# Patient Record
Sex: FEMALE | Race: BLACK OR AFRICAN AMERICAN | NOT HISPANIC OR LATINO | Employment: FULL TIME | URBAN - METROPOLITAN AREA
[De-identification: names, ages, dates, MRNs, and addresses within clinical notes are randomized per-mention and may not be internally consistent; named-entity substitution may affect disease eponyms.]

---

## 2019-05-28 ENCOUNTER — HOSPITAL ENCOUNTER (EMERGENCY)
Facility: HOSPITAL | Age: 29
Discharge: HOME | End: 2019-05-28
Attending: EMERGENCY MEDICINE
Payer: COMMERCIAL

## 2019-05-28 VITALS
WEIGHT: 162 LBS | DIASTOLIC BLOOD PRESSURE: 74 MMHG | RESPIRATION RATE: 16 BRPM | TEMPERATURE: 98 F | OXYGEN SATURATION: 100 % | HEART RATE: 63 BPM | HEIGHT: 67 IN | BODY MASS INDEX: 25.43 KG/M2 | SYSTOLIC BLOOD PRESSURE: 118 MMHG
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
## 2019-05-28 DIAGNOSIS — R51.9 NONINTRACTABLE HEADACHE, UNSPECIFIED CHRONICITY PATTERN, UNSPECIFIED HEADACHE TYPE: ICD-10-CM

## 2019-05-28 DIAGNOSIS — S29.019A THORACIC MYOFASCIAL STRAIN, INITIAL ENCOUNTER: Primary | ICD-10-CM

## 2019-05-28 DIAGNOSIS — S39.012A LUMBAR STRAIN, INITIAL ENCOUNTER: ICD-10-CM

## 2019-05-28 LAB
B-HCG UR QL: NEGATIVE
CTP QC/QA: YES

## 2019-05-28 PROCEDURE — 96372 THER/PROPH/DIAG INJ SC/IM: CPT

## 2019-05-28 PROCEDURE — 63600175 PHARM REV CODE 636 W HCPCS: Performed by: NURSE PRACTITIONER

## 2019-05-28 PROCEDURE — 99284 EMERGENCY DEPT VISIT MOD MDM: CPT | Mod: 25

## 2019-05-28 PROCEDURE — 81025 URINE PREGNANCY TEST: CPT | Performed by: NURSE PRACTITIONER

## 2019-05-28 PROCEDURE — 25000003 PHARM REV CODE 250: Performed by: NURSE PRACTITIONER

## 2019-05-28 RX ORDER — METOCLOPRAMIDE 10 MG/1
10 TABLET ORAL EVERY 6 HOURS PRN
Qty: 12 TABLET | Refills: 0 | Status: SHIPPED | OUTPATIENT
Start: 2019-05-28 | End: 2019-10-03

## 2019-05-28 RX ORDER — CYCLOBENZAPRINE HCL 10 MG
10 TABLET ORAL 3 TIMES DAILY PRN
Qty: 15 TABLET | Refills: 0 | Status: SHIPPED | OUTPATIENT
Start: 2019-05-28 | End: 2019-10-03

## 2019-05-28 RX ORDER — ETODOLAC 300 MG/1
300 CAPSULE ORAL 2 TIMES DAILY
Qty: 30 CAPSULE | Refills: 0 | Status: SHIPPED | OUTPATIENT
Start: 2019-05-28 | End: 2019-10-03

## 2019-05-28 RX ORDER — KETOROLAC TROMETHAMINE 30 MG/ML
15 INJECTION, SOLUTION INTRAMUSCULAR; INTRAVENOUS
Status: COMPLETED | OUTPATIENT
Start: 2019-05-28 | End: 2019-05-28

## 2019-05-28 RX ORDER — METOCLOPRAMIDE 10 MG/1
10 TABLET ORAL
Status: COMPLETED | OUTPATIENT
Start: 2019-05-28 | End: 2019-05-28

## 2019-05-28 RX ADMIN — METOCLOPRAMIDE 10 MG: 10 TABLET ORAL at 11:05

## 2019-05-28 RX ADMIN — KETOROLAC TROMETHAMINE 15 MG: 30 INJECTION, SOLUTION INTRAMUSCULAR at 10:05

## 2019-05-29 NOTE — DISCHARGE INSTRUCTIONS
Thank you for allowing me to care for you today.  I hope our treatment plan will make you feel better in the next few days.  In order for me to take better care of my future patients and improve our Emergency Department, I would appreciate if you can provide us with feedback.  In the next few days, you may receive a survey in the mail.  If you do, it would mean a great deal to me if you would please take the time to complete it.    Thank you and I hope you feel better.  Karina Maldonado NP
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
## 2019-10-03 ENCOUNTER — LAB VISIT (OUTPATIENT)
Dept: LAB | Facility: HOSPITAL | Age: 29
End: 2019-10-03
Attending: FAMILY MEDICINE
Payer: COMMERCIAL

## 2019-10-03 ENCOUNTER — OFFICE VISIT (OUTPATIENT)
Dept: FAMILY MEDICINE | Facility: CLINIC | Age: 29
End: 2019-10-03
Payer: COMMERCIAL

## 2019-10-03 VITALS
HEART RATE: 72 BPM | OXYGEN SATURATION: 99 % | DIASTOLIC BLOOD PRESSURE: 82 MMHG | TEMPERATURE: 98 F | RESPIRATION RATE: 16 BRPM | SYSTOLIC BLOOD PRESSURE: 118 MMHG | BODY MASS INDEX: 26.4 KG/M2 | WEIGHT: 168.19 LBS | HEIGHT: 67 IN

## 2019-10-03 DIAGNOSIS — G44.029 CHRONIC CLUSTER HEADACHE, NOT INTRACTABLE: Primary | ICD-10-CM

## 2019-10-03 DIAGNOSIS — Z00.00 WELL ADULT EXAM: ICD-10-CM

## 2019-10-03 LAB
BASOPHILS # BLD AUTO: 0.03 K/UL (ref 0–0.2)
BASOPHILS NFR BLD: 0.5 % (ref 0–1.9)
DIFFERENTIAL METHOD: ABNORMAL
EOSINOPHIL # BLD AUTO: 0.1 K/UL (ref 0–0.5)
EOSINOPHIL NFR BLD: 0.9 % (ref 0–8)
ERYTHROCYTE [DISTWIDTH] IN BLOOD BY AUTOMATED COUNT: 13.3 % (ref 11.5–14.5)
HCT VFR BLD AUTO: 38 % (ref 37–48.5)
HGB BLD-MCNC: 12 G/DL (ref 12–16)
IMM GRANULOCYTES # BLD AUTO: 0.01 K/UL (ref 0–0.04)
IMM GRANULOCYTES NFR BLD AUTO: 0.2 % (ref 0–0.5)
LYMPHOCYTES # BLD AUTO: 2.1 K/UL (ref 1–4.8)
LYMPHOCYTES NFR BLD: 35.4 % (ref 18–48)
MCH RBC QN AUTO: 27.1 PG (ref 27–31)
MCHC RBC AUTO-ENTMCNC: 31.6 G/DL (ref 32–36)
MCV RBC AUTO: 86 FL (ref 82–98)
MONOCYTES # BLD AUTO: 0.5 K/UL (ref 0.3–1)
MONOCYTES NFR BLD: 8.9 % (ref 4–15)
NEUTROPHILS # BLD AUTO: 3.2 K/UL (ref 1.8–7.7)
NEUTROPHILS NFR BLD: 54.1 % (ref 38–73)
NRBC BLD-RTO: 0 /100 WBC
PLATELET # BLD AUTO: 282 K/UL (ref 150–350)
PMV BLD AUTO: 10.7 FL (ref 9.2–12.9)
RBC # BLD AUTO: 4.43 M/UL (ref 4–5.4)
WBC # BLD AUTO: 5.84 K/UL (ref 3.9–12.7)

## 2019-10-03 PROCEDURE — 84439 ASSAY OF FREE THYROXINE: CPT

## 2019-10-03 PROCEDURE — 99204 PR OFFICE/OUTPT VISIT, NEW, LEVL IV, 45-59 MIN: ICD-10-PCS | Mod: S$GLB,,, | Performed by: FAMILY MEDICINE

## 2019-10-03 PROCEDURE — 3008F PR BODY MASS INDEX (BMI) DOCUMENTED: ICD-10-PCS | Mod: CPTII,S$GLB,, | Performed by: FAMILY MEDICINE

## 2019-10-03 PROCEDURE — 80061 LIPID PANEL: CPT

## 2019-10-03 PROCEDURE — 99204 OFFICE O/P NEW MOD 45 MIN: CPT | Mod: S$GLB,,, | Performed by: FAMILY MEDICINE

## 2019-10-03 PROCEDURE — 99999 PR PBB SHADOW E&M-EST. PATIENT-LVL III: ICD-10-PCS | Mod: PBBFAC,,, | Performed by: FAMILY MEDICINE

## 2019-10-03 PROCEDURE — 36415 COLL VENOUS BLD VENIPUNCTURE: CPT | Mod: PO

## 2019-10-03 PROCEDURE — 99999 PR PBB SHADOW E&M-EST. PATIENT-LVL III: CPT | Mod: PBBFAC,,, | Performed by: FAMILY MEDICINE

## 2019-10-03 PROCEDURE — 82306 VITAMIN D 25 HYDROXY: CPT

## 2019-10-03 PROCEDURE — 85025 COMPLETE CBC W/AUTO DIFF WBC: CPT

## 2019-10-03 PROCEDURE — 80053 COMPREHEN METABOLIC PANEL: CPT

## 2019-10-03 PROCEDURE — 83036 HEMOGLOBIN GLYCOSYLATED A1C: CPT

## 2019-10-03 PROCEDURE — 84443 ASSAY THYROID STIM HORMONE: CPT

## 2019-10-03 PROCEDURE — 3008F BODY MASS INDEX DOCD: CPT | Mod: CPTII,S$GLB,, | Performed by: FAMILY MEDICINE

## 2019-10-03 RX ORDER — BUTALBITAL, ACETAMINOPHEN AND CAFFEINE 50; 325; 40 MG/1; MG/1; MG/1
1 TABLET ORAL 2 TIMES DAILY PRN
Qty: 20 TABLET | Refills: 1 | Status: SHIPPED | OUTPATIENT
Start: 2019-10-03 | End: 2019-10-13

## 2019-10-03 NOTE — PROGRESS NOTES
Chief Complaint   Patient presents with    Headaches    Cyst     On neck    Establish Care       Unique Cody is a 28 y.o. female who presents to [facility].  Chronic medical issues, if present, have been documented.  Acute medical issues, if present have been documented in the Chief Complaint.     Cyst   Associated symptoms include headaches. Pertinent negatives include no abdominal pain, anorexia, arthralgias, chest pain, chills, congestion, coughing, diaphoresis, fatigue, fever, joint swelling, myalgias, nausea, neck pain, numbness, sore throat, swollen glands, visual change, vomiting or weakness.   Headache    This is a recurrent problem. The current episode started 1 to 4 weeks ago. The problem occurs daily. The problem has been unchanged. The pain is located in the bilateral, frontal and parietal region. The pain does not radiate. The pain quality is similar to prior headaches. The quality of the pain is described as aching, dull, shooting and stabbing. The pain is at a severity of 5/10. The pain is moderate. Associated symptoms include dizziness, phonophobia and photophobia. Pertinent negatives include no abdominal pain, abnormal behavior, anorexia, back pain, blurred vision, coughing, drainage, ear pain, eye pain, eye redness, eye watering, facial sweating, fever, hearing loss, insomnia, loss of balance, muscle aches, nausea, neck pain, numbness, rhinorrhea, scalp tenderness, seizures, sinus pressure, sore throat, swollen glands, tingling, tinnitus, visual change, vomiting, weakness or weight loss. The symptoms are aggravated by bright light and noise. She has tried acetaminophen and NSAIDs for the symptoms. The treatment provided moderate relief. There is no history of cancer, cluster headaches, hypertension, immunosuppression, migraine headaches, migraines in the family, obesity, pseudotumor cerebri, recent head traumas, sinus disease or TMJ.       ROS  Review of Systems   Constitutional:  "Negative.  Negative for activity change, appetite change, chills, diaphoresis, fatigue, fever, unexpected weight change and weight loss.   HENT: Negative.  Negative for congestion, ear pain, hearing loss, nosebleeds, postnasal drip, rhinorrhea, sinus pressure, sneezing, sore throat, tinnitus and trouble swallowing.    Eyes: Positive for photophobia. Negative for blurred vision, pain, redness and visual disturbance.   Respiratory: Negative for cough, choking and shortness of breath.    Cardiovascular: Negative for chest pain and leg swelling.   Gastrointestinal: Negative for abdominal pain, anorexia, constipation, diarrhea, nausea and vomiting.   Genitourinary: Negative for difficulty urinating, dysuria, frequency and urgency.   Musculoskeletal: Negative.  Negative for arthralgias, back pain, gait problem, joint swelling, myalgias and neck pain.   Skin: Negative.    Allergic/Immunologic: Negative for environmental allergies and food allergies.   Neurological: Positive for dizziness and headaches. Negative for tingling, tremors, seizures, syncope, facial asymmetry, speech difficulty, weakness, light-headedness, numbness and loss of balance.   Psychiatric/Behavioral: Negative.  Negative for confusion, decreased concentration, dysphoric mood and sleep disturbance. The patient is not nervous/anxious and does not have insomnia.        Physical Exam  Vitals:    10/03/19 1337   BP: 118/82   Pulse: 72   Resp: 16   Temp: 98.1 °F (36.7 °C)    Body mass index is 26.35 kg/m².  Weight: 76.3 kg (168 lb 3.4 oz)   Height: 5' 7" (170.2 cm)     Physical Exam   Constitutional: She is oriented to person, place, and time. She appears well-developed and well-nourished. She is active and cooperative.  Non-toxic appearance. She does not have a sickly appearance. She does not appear ill. No distress.   HENT:   Head: Normocephalic and atraumatic.       Right Ear: Hearing and external ear normal. No decreased hearing is noted.   Left Ear: " Hearing and external ear normal. No decreased hearing is noted.   Nose: Nose normal. No rhinorrhea or nasal deformity.   Mouth/Throat: Uvula is midline and oropharynx is clear and moist. She does not have dentures. Normal dentition.   Eyes: Pupils are equal, round, and reactive to light. Conjunctivae, EOM and lids are normal. Right eye exhibits no chemosis, no discharge and no exudate. No foreign body present in the right eye. Left eye exhibits no chemosis, no discharge and no exudate. No foreign body present in the left eye. No scleral icterus.   Neck: Normal range of motion and full passive range of motion without pain. Neck supple.   Cardiovascular: Normal rate, regular rhythm, S1 normal, S2 normal and normal heart sounds. Exam reveals no gallop and no friction rub.   No murmur heard.  Pulmonary/Chest: Effort normal and breath sounds normal. No accessory muscle usage. No respiratory distress. She has no decreased breath sounds. She has no wheezes. She has no rhonchi. She has no rales.   Abdominal: Soft. Normal appearance. She exhibits no distension. There is no hepatosplenomegaly. There is no tenderness. There is no rigidity, no rebound and no guarding.   Musculoskeletal: Normal range of motion.   Neurological: She is alert and oriented to person, place, and time. She has normal strength. No cranial nerve deficit or sensory deficit. She exhibits normal muscle tone. She displays no seizure activity. Coordination and gait normal.   Skin: Skin is warm, dry and intact. No rash noted. She is not diaphoretic.   Psychiatric: She has a normal mood and affect. Her speech is normal and behavior is normal. Judgment and thought content normal. Cognition and memory are normal. She is attentive.       Assessment & Plan    Discussion of plan of care including treatment options regarding health and wellness were reviewed and discussed with patient.  Any changes to medication or treatment plan, as well as any screening blood  test, imaging, or referrals to specialist, are documented.  Follow up as indicated.     1. Chronic cluster headache, not intractable  I encouraged patient to use an over the counter pain medication containing caffeine to alleviate the symptoms.  I also recommended patient avoid strenuous activity and minimize surrounding stimulation.  If headache is persistent, patient should follow up for further   evaluation, which may include vision screening and neurology consult.  Medication prescribed for use only as symptoms require.    - butalbital-acetaminophen-caffeine -40 mg (FIORICET, ESGIC) -40 mg per tablet; Take 1 tablet by mouth 2 (two) times daily as needed for Pain.  Dispense: 20 tablet; Refill: 1    2. Well adult exam  Screening test will be ordered and once results available patient will be notified of results and managed accordingly.    - CBC auto differential; Future  - Comprehensive metabolic panel; Future  - Hemoglobin A1c; Future  - Lipid panel; Future  - Vitamin D; Future  - TSH; Future  - T4, free; Future       No follow-ups on file.      ACTIVE MEDICAL ISSUES:  Documented in Problem List    PAST MEDICAL HISTORY  Documented  .  PAST SURGICAL HISTORY:  Documented    SOCIAL HISTORY:  Documented    FAMILY HISTORY:  Documented    ALLERGIES AND MEDICATIONS: updated and reviewed.  Documented    Health Maintenance       Date Due Completion Date    TETANUS VACCINE 06/06/2011 6/6/2001    Influenza Vaccine (1) 09/01/2019 ---    Pap Smear with HPV Cotest 08/09/2020 8/9/2017

## 2019-10-04 LAB
25(OH)D3+25(OH)D2 SERPL-MCNC: 22 NG/ML (ref 30–96)
ALBUMIN SERPL BCP-MCNC: 3.9 G/DL (ref 3.5–5.2)
ALP SERPL-CCNC: 70 U/L (ref 55–135)
ALT SERPL W/O P-5'-P-CCNC: 11 U/L (ref 10–44)
ANION GAP SERPL CALC-SCNC: 10 MMOL/L (ref 8–16)
AST SERPL-CCNC: 19 U/L (ref 10–40)
BILIRUB SERPL-MCNC: 0.3 MG/DL (ref 0.1–1)
BUN SERPL-MCNC: 15 MG/DL (ref 6–20)
CALCIUM SERPL-MCNC: 9 MG/DL (ref 8.7–10.5)
CHLORIDE SERPL-SCNC: 108 MMOL/L (ref 95–110)
CHOLEST SERPL-MCNC: 181 MG/DL (ref 120–199)
CHOLEST/HDLC SERPL: 2.7 {RATIO} (ref 2–5)
CO2 SERPL-SCNC: 23 MMOL/L (ref 23–29)
CREAT SERPL-MCNC: 0.8 MG/DL (ref 0.5–1.4)
EST. GFR  (AFRICAN AMERICAN): >60 ML/MIN/1.73 M^2
EST. GFR  (NON AFRICAN AMERICAN): >60 ML/MIN/1.73 M^2
ESTIMATED AVG GLUCOSE: 105 MG/DL (ref 68–131)
GLUCOSE SERPL-MCNC: 73 MG/DL (ref 70–110)
HBA1C MFR BLD HPLC: 5.3 % (ref 4–5.6)
HDLC SERPL-MCNC: 68 MG/DL (ref 40–75)
HDLC SERPL: 37.6 % (ref 20–50)
LDLC SERPL CALC-MCNC: 100 MG/DL (ref 63–159)
NONHDLC SERPL-MCNC: 113 MG/DL
POTASSIUM SERPL-SCNC: 4.6 MMOL/L (ref 3.5–5.1)
PROT SERPL-MCNC: 7 G/DL (ref 6–8.4)
SODIUM SERPL-SCNC: 141 MMOL/L (ref 136–145)
T4 FREE SERPL-MCNC: 0.88 NG/DL (ref 0.71–1.51)
TRIGL SERPL-MCNC: 65 MG/DL (ref 30–150)
TSH SERPL DL<=0.005 MIU/L-ACNC: 0.93 UIU/ML (ref 0.4–4)

## 2019-10-29 ENCOUNTER — PATIENT OUTREACH (OUTPATIENT)
Dept: ADMINISTRATIVE | Facility: HOSPITAL | Age: 29
End: 2019-10-29

## 2019-11-12 ENCOUNTER — TELEPHONE (OUTPATIENT)
Dept: FAMILY MEDICINE | Facility: CLINIC | Age: 29
End: 2019-11-12

## 2019-11-12 NOTE — TELEPHONE ENCOUNTER
Pt was scheduled today but was not able to come in. Patient wondering if PCP can look at results. Please advise of recent labs.    Component      Latest Ref Rng & Units 10/3/2019   WBC      3.90 - 12.70 K/uL 5.84   RBC      4.00 - 5.40 M/uL 4.43   Hemoglobin      12.0 - 16.0 g/dL 12.0   Hematocrit      37.0 - 48.5 % 38.0   MCV      82 - 98 fL 86   MCH      27.0 - 31.0 pg 27.1   MCHC      32.0 - 36.0 g/dL 31.6 (L)   RDW      11.5 - 14.5 % 13.3   Platelets      150 - 350 K/uL 282   MPV      9.2 - 12.9 fL 10.7   Immature Granulocytes      0.0 - 0.5 % 0.2   Gran # (ANC)      1.8 - 7.7 K/uL 3.2   Immature Grans (Abs)      0.00 - 0.04 K/uL 0.01   Lymph #      1.0 - 4.8 K/uL 2.1   Mono #      0.3 - 1.0 K/uL 0.5   Eos #      0.0 - 0.5 K/uL 0.1   Baso #      0.00 - 0.20 K/uL 0.03   nRBC      0 /100 WBC 0   Gran%      38.0 - 73.0 % 54.1   Lymph%      18.0 - 48.0 % 35.4   Mono%      4.0 - 15.0 % 8.9   Eosinophil%      0.0 - 8.0 % 0.9   Basophil%      0.0 - 1.9 % 0.5   Differential Method       Automated   Sodium      136 - 145 mmol/L 141   Potassium      3.5 - 5.1 mmol/L 4.6   Chloride      95 - 110 mmol/L 108   CO2      23 - 29 mmol/L 23   Glucose      70 - 110 mg/dL 73   BUN, Bld      6 - 20 mg/dL 15   Creatinine      0.5 - 1.4 mg/dL 0.8   Calcium      8.7 - 10.5 mg/dL 9.0   PROTEIN TOTAL      6.0 - 8.4 g/dL 7.0   Albumin      3.5 - 5.2 g/dL 3.9   BILIRUBIN TOTAL      0.1 - 1.0 mg/dL 0.3   Alkaline Phosphatase      55 - 135 U/L 70   AST      10 - 40 U/L 19   ALT      10 - 44 U/L 11   Anion Gap      8 - 16 mmol/L 10   eGFR if African American      >60 mL/min/1.73 m:2 >60.0   eGFR if non African American      >60 mL/min/1.73 m:2 >60.0   Cholesterol      120 - 199 mg/dL 181   Triglycerides      30 - 150 mg/dL 65   HDL      40 - 75 mg/dL 68   LDL Cholesterol External      63.0 - 159.0 mg/dL 100.0   Hdl/Cholesterol Ratio      20.0 - 50.0 % 37.6   Total Cholesterol/HDL Ratio      2.0 - 5.0 2.7   Non-HDL Cholesterol      mg/dL 113    Hemoglobin A1C External      4.0 - 5.6 % 5.3   Estimated Avg Glucose      68 - 131 mg/dL 105   Vit D, 25-Hydroxy      30 - 96 ng/mL 22 (L)   TSH      0.400 - 4.000 uIU/mL 0.930   Free T4      0.71 - 1.51 ng/dL 0.88

## 2019-11-13 NOTE — TELEPHONE ENCOUNTER
Cholesterol improved  No longer in pre-diabetic range  Vit D low, recommend 2000 mg Vit D3 daily  All other labs normal

## 2020-04-06 ENCOUNTER — NURSE TRIAGE (OUTPATIENT)
Dept: ADMINISTRATIVE | Facility: CLINIC | Age: 30
End: 2020-04-06

## 2020-04-06 ENCOUNTER — OFFICE VISIT (OUTPATIENT)
Dept: FAMILY MEDICINE | Facility: CLINIC | Age: 30
End: 2020-04-06
Payer: COMMERCIAL

## 2020-04-06 DIAGNOSIS — R07.89 ACUTE CHEST WALL PAIN: Primary | ICD-10-CM

## 2020-04-06 PROCEDURE — 99213 OFFICE O/P EST LOW 20 MIN: CPT | Mod: GT,,, | Performed by: FAMILY MEDICINE

## 2020-04-06 PROCEDURE — 99213 PR OFFICE/OUTPT VISIT, EST, LEVL III, 20-29 MIN: ICD-10-PCS | Mod: GT,,, | Performed by: FAMILY MEDICINE

## 2020-04-06 NOTE — PROGRESS NOTES
Unique Cody is a 29 y.o. female.      Visit type: Virtual visit with synchronous audio and video  The patient is located outside of this physical clinic and a thorough physical exam was not performed.  The chief complaint was presented by patient and discussed during visit and is documented below.    100% of visit was face to face counseling, and total visit lasted over 15 minutes.     Each patient provided medical services by telemedicine is:  (1) informed of the relationship between the physician and patient and the respective role of any other health care provider with respect to management of the patient; and (2) notified that he or she may decline to receive medical services by telemedicine and may withdraw from such care at any time.    Notes:    Chest Pain    This is a new problem. The current episode started in the past 7 days. The onset quality is gradual. The problem occurs daily. The problem has been waxing and waning. The pain is present in the substernal region. The pain is at a severity of 3/10. The pain is mild. The quality of the pain is described as burning. The pain radiates to the upper back. Associated symptoms include back pain and dizziness. Pertinent negatives include no abdominal pain, claudication, cough, diaphoresis, exertional chest pressure, fever, headaches, hemoptysis, irregular heartbeat, leg pain, lower extremity edema, malaise/fatigue, nausea, near-syncope, numbness, orthopnea, palpitations, shortness of breath, sputum production, syncope, vomiting or weakness. She has tried nothing for the symptoms. There are no known risk factors.   Pertinent negatives for past medical history include no anxiety/panic attacks and no seizures.        ROS  Review of Systems   Constitutional: Negative.  Negative for activity change, appetite change, chills, diaphoresis, fatigue, fever, malaise/fatigue and unexpected weight change.   HENT: Negative.  Negative for congestion, ear pain, hearing  loss, nosebleeds, postnasal drip, rhinorrhea, sinus pressure, sneezing, sore throat and trouble swallowing.    Eyes: Negative for pain and visual disturbance.   Respiratory: Negative for cough, hemoptysis, sputum production, choking and shortness of breath.    Cardiovascular: Positive for chest pain (chest wall). Negative for palpitations, orthopnea, claudication, leg swelling, syncope and near-syncope.   Gastrointestinal: Negative for abdominal pain, constipation, diarrhea, nausea and vomiting.   Genitourinary: Negative for difficulty urinating, dysuria, frequency and urgency.   Musculoskeletal: Positive for back pain. Negative for arthralgias, gait problem, joint swelling and myalgias.   Skin: Negative.    Allergic/Immunologic: Negative for environmental allergies and food allergies.   Neurological: Positive for dizziness. Negative for seizures, syncope, weakness, light-headedness, numbness and headaches.   Psychiatric/Behavioral: Negative.  Negative for confusion, decreased concentration, dysphoric mood and sleep disturbance. The patient is not nervous/anxious.        Assessment & Plan    Discussion of plan of care including treatment options regarding health and wellness were reviewed and discussed with patient.  Any changes to medication or treatment plan, as well as any screening blood test, imaging, or referrals to specialist, are documented.  Follow up as indicated.     1. Acute chest wall pain  Advised that pain may be musculoskeletal and related to indigestion or heartburn, but that anxiety and muscle tension may be a factor.  Recommended PPI therapy and warm compress to back to alleviate symptoms; use of naproxen recommended as needed.       Follow up if symptoms worsen or fail to improve.        ACTIVE MEDICAL ISSUES:  Documented in Problem List    PAST MEDICAL HISTORY  Documented    PAST SURGICAL HISTORY:  Documented    SOCIAL HISTORY:  Documented    FAMILY HISTORY:  Documented    ALLERGIES AND  MEDICATIONS: updated and reviewed.  Documented    Health Maintenance       Date Due Completion Date    HIV Screening 12/24/2003 ---    TETANUS VACCINE 06/06/2011 6/6/2001    Influenza Vaccine (1) 09/01/2019 ---    Pap Smear with HPV Cotest 04/09/2022 4/9/2019

## 2020-04-06 NOTE — TELEPHONE ENCOUNTER
Reason for Disposition   Pain also present in shoulder(s) or arm(s) or jaw    Additional Information   Negative: Severe difficulty breathing (e.g., struggling for each breath, speaks in single words)   Negative: Passed out (i.e., fainted, collapsed and was not responding)   Negative: Chest pain lasting longer than 5 minutes and ANY of the following:* Over 50 years old* Over 30 years old and at least one cardiac risk factor (i.e., high blood pressure, diabetes, high cholesterol, obesity, smoker or strong family history of heart disease)* Pain is crushing, pressure-like, or heavy * Took nitroglycerin and chest pain was not relieved* History of heart disease (i.e., angina, heart attack, bypass surgery, angioplasty, CHF)   Negative: Visible sweat on face or sweat dripping down face   Negative: Sounds like a life-threatening emergency to the triager   Negative: Followed an injury to chest   Negative: SEVERE chest pain    Protocols used: CHEST PAIN-A-[place]    Pt stated she has chest pain that is also present in her shoulder. Stated pain comes and goes and feels heavy on her chest. Per triage protocol, advised to go to ED for evaluation and not to drive. Pt verbalized understanding.

## 2020-08-05 ENCOUNTER — TELEPHONE (OUTPATIENT)
Dept: OBSTETRICS AND GYNECOLOGY | Facility: CLINIC | Age: 30
End: 2020-08-05

## 2020-08-05 NOTE — TELEPHONE ENCOUNTER
Returned patient call, unable to scheudle with Dr. Mclean as patient does not want to wait until next available in Dec, scheduled patient with Dr. Matos      ----- Message from Galen Higuera sent at 8/5/2020 10:51 AM CDT -----  New pt will like to schedule a appt w/  [phone]

## 2020-08-10 ENCOUNTER — PATIENT OUTREACH (OUTPATIENT)
Dept: ADMINISTRATIVE | Facility: OTHER | Age: 30
End: 2020-08-10

## 2020-08-10 NOTE — PROGRESS NOTES
Care Everywhere: updated  Immunization: updated (delay in links system)   Health Maintenance: updated  Media Review:   Legacy Review:   Order placed:   Upcoming appts:

## 2020-08-14 DIAGNOSIS — Z11.59 NEED FOR HEPATITIS C SCREENING TEST: ICD-10-CM

## 2020-09-30 ENCOUNTER — OFFICE VISIT (OUTPATIENT)
Dept: FAMILY MEDICINE | Facility: CLINIC | Age: 30
End: 2020-09-30
Payer: COMMERCIAL

## 2020-09-30 VITALS
HEART RATE: 74 BPM | BODY MASS INDEX: 26.37 KG/M2 | WEIGHT: 168 LBS | OXYGEN SATURATION: 99 % | HEIGHT: 67 IN | RESPIRATION RATE: 16 BRPM | TEMPERATURE: 98 F | SYSTOLIC BLOOD PRESSURE: 116 MMHG | DIASTOLIC BLOOD PRESSURE: 76 MMHG

## 2020-09-30 DIAGNOSIS — Z00.00 WELL ADULT EXAM: Primary | ICD-10-CM

## 2020-09-30 DIAGNOSIS — N92.6 ABNORMAL MENSES: ICD-10-CM

## 2020-09-30 PROCEDURE — 99395 PR PREVENTIVE VISIT,EST,18-39: ICD-10-PCS | Mod: S$GLB,,, | Performed by: FAMILY MEDICINE

## 2020-09-30 PROCEDURE — 99395 PREV VISIT EST AGE 18-39: CPT | Mod: S$GLB,,, | Performed by: FAMILY MEDICINE

## 2020-09-30 PROCEDURE — 99999 PR PBB SHADOW E&M-EST. PATIENT-LVL IV: CPT | Mod: PBBFAC,,, | Performed by: FAMILY MEDICINE

## 2020-09-30 PROCEDURE — 99999 PR PBB SHADOW E&M-EST. PATIENT-LVL IV: ICD-10-PCS | Mod: PBBFAC,,, | Performed by: FAMILY MEDICINE

## 2020-09-30 NOTE — PROGRESS NOTES
"Chief Complaint   Patient presents with    Annual Exam       Unique Cody is a 29 y.o. female who presents per the Chief Complaint.  Pt is known to me and was last seen by me on 4/6/2020.  All known chronic medical issues have been documented.    HPI    ROS  Review of Systems   Constitutional: Negative.  Negative for activity change, appetite change, chills, fatigue and fever.   HENT: Negative for congestion, ear pain, hearing loss, postnasal drip, rhinorrhea, sinus pressure, sore throat and trouble swallowing.    Eyes: Negative.  Negative for pain and visual disturbance.   Respiratory: Negative for cough, chest tightness and shortness of breath.    Cardiovascular: Negative for chest pain and leg swelling.   Gastrointestinal: Positive for constipation (occasional). Negative for abdominal pain, diarrhea, nausea and vomiting.   Endocrine: Negative.    Genitourinary: Positive for menstrual problem. Negative for difficulty urinating, dysuria, flank pain, pelvic pain and urgency.   Musculoskeletal: Negative.  Negative for arthralgias, gait problem, myalgias, neck pain and neck stiffness.   Skin: Negative.  Negative for rash.   Allergic/Immunologic: Negative.  Negative for environmental allergies, food allergies and immunocompromised state.   Neurological: Negative.  Negative for weakness, light-headedness and headaches.   Hematological: Negative.    Psychiatric/Behavioral: Negative.  Negative for decreased concentration, dysphoric mood and sleep disturbance. The patient is not nervous/anxious.        Physical Exam  Vitals:    09/30/20 1641   BP: 116/76   Pulse: 74   Resp: 16   Temp: 98.4 °F (36.9 °C)    Body mass index is 26.31 kg/m².  Weight: 76.2 kg (167 lb 15.9 oz)   Height: 5' 7" (170.2 cm)     Physical Exam  Vitals signs reviewed.   Constitutional:       General: She is not in acute distress.     Appearance: Normal appearance. She is well-developed. She is not ill-appearing, toxic-appearing or diaphoretic. "   HENT:      Head: Normocephalic and atraumatic.      Right Ear: Hearing, tympanic membrane, ear canal and external ear normal. No decreased hearing noted. No tenderness. No foreign body. Tympanic membrane is not injected, scarred, perforated, erythematous, retracted or bulging.      Left Ear: Hearing, tympanic membrane, ear canal and external ear normal. No decreased hearing noted. No tenderness. No foreign body. Tympanic membrane is not injected, scarred, perforated, erythematous, retracted or bulging.      Nose: Nose normal. No nasal deformity or rhinorrhea.      Mouth/Throat:      Dentition: Does not have dentures. No dental caries.      Pharynx: Uvula midline.   Eyes:      General: Lids are normal. No scleral icterus.        Right eye: No foreign body or discharge.         Left eye: No foreign body or discharge.      Conjunctiva/sclera: Conjunctivae normal.      Right eye: No chemosis or exudate.     Left eye: No chemosis or exudate.     Pupils: Pupils are equal, round, and reactive to light.   Neck:      Musculoskeletal: Full passive range of motion without pain, normal range of motion and neck supple.      Thyroid: No thyroid mass or thyromegaly.   Cardiovascular:      Rate and Rhythm: Normal rate and regular rhythm.      Heart sounds: Normal heart sounds, S1 normal and S2 normal. No murmur. No friction rub. No gallop.    Pulmonary:      Effort: Pulmonary effort is normal.      Breath sounds: No decreased breath sounds, wheezing, rhonchi or rales.   Chest:      Chest wall: No mass, deformity or tenderness.   Abdominal:      General: Bowel sounds are normal. There is no distension.      Palpations: Abdomen is soft. Abdomen is not rigid. There is no mass.      Tenderness: There is no abdominal tenderness. There is no guarding or rebound.   Musculoskeletal: Normal range of motion.   Lymphadenopathy:      Head:      Right side of head: No submental, submandibular, tonsillar, preauricular or posterior auricular  adenopathy.      Left side of head: No submental, submandibular, tonsillar, preauricular or posterior auricular adenopathy.      Cervical: No cervical adenopathy.   Skin:     General: Skin is warm and dry.      Coloration: Skin is not pale.      Findings: No rash.   Neurological:      Mental Status: She is alert and oriented to person, place, and time.      Cranial Nerves: No cranial nerve deficit.      Sensory: No sensory deficit.      Motor: No abnormal muscle tone or seizure activity.   Psychiatric:         Attention and Perception: She is attentive.         Speech: Speech normal.         Behavior: Behavior normal. Behavior is cooperative.         Thought Content: Thought content normal.         Judgment: Judgment normal.         Assessment & Plan    Discussion of plan of care including treatment options regarding health and wellness were reviewed and discussed with patient.  Any changes to medication or treatment plan, as well as any screening blood test, imaging, or referrals to specialist, are documented.  Follow up as indicated.     1. Well adult exam  Discussed age and gender appropriate screenings at this visit and encouraged a healthy diet with low saturated fats, and increased physical activity.  Counseled on medically appropriate vaccines based on age and current health condition.  Screening test will be ordered and once completed, patient will be notified of results when available.  If necessary, will follow up to discuss and manage further.   - CBC auto differential; Future  - Comprehensive metabolic panel; Future  - Vitamin D; Future  - TSH; Future  - T4, free; Future  - Hemoglobin A1C; Future  - Lipid Panel; Future  - HIV 1/2 Ag/Ab (4th Gen); Future    2. Abnormal menses  Referral to appropriate specialist for evaluation and management placed in [facility].   - Ambulatory referral/consult to Obstetrics / Gynecology; Future       Follow up in about 1 year (around 9/30/2021).      ACTIVE MEDICAL  ISSUES:  Documented in Problem List    PAST MEDICAL HISTORY  Documented  .  PAST SURGICAL HISTORY:  Documented    SOCIAL HISTORY:  Documented    FAMILY HISTORY:  Documented    ALLERGIES AND MEDICATIONS: updated and reviewed.  Documented    Health Maintenance       Date Due Completion Date    Hepatitis C Screening 1988 ---    HIV Screening 12/24/2003 ---    TETANUS VACCINE 06/06/2011 6/6/2001    Influenza Vaccine (1) 08/01/2020 ---    Cervical Cancer Screening 04/09/2022 4/9/2019

## 2020-10-01 ENCOUNTER — LAB VISIT (OUTPATIENT)
Dept: LAB | Facility: HOSPITAL | Age: 30
End: 2020-10-01
Attending: FAMILY MEDICINE
Payer: COMMERCIAL

## 2020-10-01 DIAGNOSIS — Z00.00 WELL ADULT EXAM: ICD-10-CM

## 2020-10-01 DIAGNOSIS — Z11.59 NEED FOR HEPATITIS C SCREENING TEST: ICD-10-CM

## 2020-10-01 LAB
25(OH)D3+25(OH)D2 SERPL-MCNC: 22 NG/ML (ref 30–96)
ALBUMIN SERPL BCP-MCNC: 4.1 G/DL (ref 3.5–5.2)
ALP SERPL-CCNC: 66 U/L (ref 55–135)
ALT SERPL W/O P-5'-P-CCNC: 13 U/L (ref 10–44)
ANION GAP SERPL CALC-SCNC: 10 MMOL/L (ref 8–16)
AST SERPL-CCNC: 19 U/L (ref 10–40)
BASOPHILS # BLD AUTO: 0.02 K/UL (ref 0–0.2)
BASOPHILS NFR BLD: 0.3 % (ref 0–1.9)
BILIRUB SERPL-MCNC: 0.5 MG/DL (ref 0.1–1)
BUN SERPL-MCNC: 12 MG/DL (ref 6–20)
CALCIUM SERPL-MCNC: 9 MG/DL (ref 8.7–10.5)
CHLORIDE SERPL-SCNC: 105 MMOL/L (ref 95–110)
CHOLEST SERPL-MCNC: 198 MG/DL (ref 120–199)
CHOLEST/HDLC SERPL: 2.6 {RATIO} (ref 2–5)
CO2 SERPL-SCNC: 25 MMOL/L (ref 23–29)
CREAT SERPL-MCNC: 0.8 MG/DL (ref 0.5–1.4)
DIFFERENTIAL METHOD: ABNORMAL
EOSINOPHIL # BLD AUTO: 0 K/UL (ref 0–0.5)
EOSINOPHIL NFR BLD: 0.2 % (ref 0–8)
ERYTHROCYTE [DISTWIDTH] IN BLOOD BY AUTOMATED COUNT: 13.3 % (ref 11.5–14.5)
EST. GFR  (AFRICAN AMERICAN): >60 ML/MIN/1.73 M^2
EST. GFR  (NON AFRICAN AMERICAN): >60 ML/MIN/1.73 M^2
ESTIMATED AVG GLUCOSE: 111 MG/DL (ref 68–131)
GLUCOSE SERPL-MCNC: 62 MG/DL (ref 70–110)
HBA1C MFR BLD HPLC: 5.5 % (ref 4–5.6)
HCT VFR BLD AUTO: 41.6 % (ref 37–48.5)
HDLC SERPL-MCNC: 77 MG/DL (ref 40–75)
HDLC SERPL: 38.9 % (ref 20–50)
HGB BLD-MCNC: 12.8 G/DL (ref 12–16)
IMM GRANULOCYTES # BLD AUTO: 0.01 K/UL (ref 0–0.04)
IMM GRANULOCYTES NFR BLD AUTO: 0.2 % (ref 0–0.5)
LDLC SERPL CALC-MCNC: 109.6 MG/DL (ref 63–159)
LYMPHOCYTES # BLD AUTO: 1.9 K/UL (ref 1–4.8)
LYMPHOCYTES NFR BLD: 31.5 % (ref 18–48)
MCH RBC QN AUTO: 27.1 PG (ref 27–31)
MCHC RBC AUTO-ENTMCNC: 30.8 G/DL (ref 32–36)
MCV RBC AUTO: 88 FL (ref 82–98)
MONOCYTES # BLD AUTO: 0.3 K/UL (ref 0.3–1)
MONOCYTES NFR BLD: 5.4 % (ref 4–15)
NEUTROPHILS # BLD AUTO: 3.8 K/UL (ref 1.8–7.7)
NEUTROPHILS NFR BLD: 62.4 % (ref 38–73)
NONHDLC SERPL-MCNC: 121 MG/DL
NRBC BLD-RTO: 0 /100 WBC
PLATELET # BLD AUTO: 313 K/UL (ref 150–350)
PMV BLD AUTO: 10.7 FL (ref 9.2–12.9)
POTASSIUM SERPL-SCNC: 4.3 MMOL/L (ref 3.5–5.1)
PROT SERPL-MCNC: 7.5 G/DL (ref 6–8.4)
RBC # BLD AUTO: 4.72 M/UL (ref 4–5.4)
SODIUM SERPL-SCNC: 140 MMOL/L (ref 136–145)
T4 FREE SERPL-MCNC: 1.07 NG/DL (ref 0.71–1.51)
TRIGL SERPL-MCNC: 57 MG/DL (ref 30–150)
TSH SERPL DL<=0.005 MIU/L-ACNC: 1.3 UIU/ML (ref 0.4–4)
WBC # BLD AUTO: 6.09 K/UL (ref 3.9–12.7)

## 2020-10-01 PROCEDURE — 82306 VITAMIN D 25 HYDROXY: CPT

## 2020-10-01 PROCEDURE — 86703 HIV-1/HIV-2 1 RESULT ANTBDY: CPT

## 2020-10-01 PROCEDURE — 36415 COLL VENOUS BLD VENIPUNCTURE: CPT | Mod: PO

## 2020-10-01 PROCEDURE — 86803 HEPATITIS C AB TEST: CPT

## 2020-10-01 PROCEDURE — 84439 ASSAY OF FREE THYROXINE: CPT

## 2020-10-01 PROCEDURE — 85025 COMPLETE CBC W/AUTO DIFF WBC: CPT

## 2020-10-01 PROCEDURE — 80061 LIPID PANEL: CPT

## 2020-10-01 PROCEDURE — 83036 HEMOGLOBIN GLYCOSYLATED A1C: CPT

## 2020-10-01 PROCEDURE — 84443 ASSAY THYROID STIM HORMONE: CPT

## 2020-10-01 PROCEDURE — 80053 COMPREHEN METABOLIC PANEL: CPT

## 2020-10-02 LAB
HCV AB SERPL QL IA: NEGATIVE
HIV 1+2 AB+HIV1 P24 AG SERPL QL IA: NEGATIVE

## 2020-12-11 ENCOUNTER — PATIENT OUTREACH (OUTPATIENT)
Dept: ADMINISTRATIVE | Facility: OTHER | Age: 30
End: 2020-12-11

## 2020-12-31 ENCOUNTER — HOSPITAL ENCOUNTER (EMERGENCY)
Facility: HOSPITAL | Age: 30
Discharge: HOME | End: 2020-12-31
Attending: EMERGENCY MEDICINE
Payer: COMMERCIAL

## 2020-12-31 VITALS
WEIGHT: 167 LBS | BODY MASS INDEX: 26.21 KG/M2 | TEMPERATURE: 98 F | OXYGEN SATURATION: 100 % | HEART RATE: 71 BPM | HEIGHT: 67 IN | SYSTOLIC BLOOD PRESSURE: 129 MMHG | DIASTOLIC BLOOD PRESSURE: 77 MMHG | RESPIRATION RATE: 18 BRPM

## 2020-12-31 DIAGNOSIS — Z20.822 COVID-19 VIRUS NOT DETECTED: Primary | ICD-10-CM

## 2020-12-31 LAB
CTP QC/QA: YES
SARS-COV-2 RDRP RESP QL NAA+PROBE: NEGATIVE

## 2020-12-31 PROCEDURE — 99283 EMERGENCY DEPT VISIT LOW MDM: CPT

## 2020-12-31 PROCEDURE — 99282 PR EMERGENCY DEPT VISIT,LEVEL II: ICD-10-PCS | Mod: CS,,, | Performed by: PHYSICIAN ASSISTANT

## 2020-12-31 PROCEDURE — 99282 EMERGENCY DEPT VISIT SF MDM: CPT | Mod: CS,,, | Performed by: PHYSICIAN ASSISTANT

## 2020-12-31 PROCEDURE — U0002 COVID-19 LAB TEST NON-CDC: HCPCS | Performed by: EMERGENCY MEDICINE

## 2021-03-22 ENCOUNTER — OFFICE VISIT (OUTPATIENT)
Dept: OBSTETRICS AND GYNECOLOGY | Facility: CLINIC | Age: 31
End: 2021-03-22
Payer: COMMERCIAL

## 2021-03-22 ENCOUNTER — PATIENT OUTREACH (OUTPATIENT)
Dept: ADMINISTRATIVE | Facility: OTHER | Age: 31
End: 2021-03-22

## 2021-03-22 VITALS
WEIGHT: 171.94 LBS | HEIGHT: 67 IN | DIASTOLIC BLOOD PRESSURE: 70 MMHG | BODY MASS INDEX: 26.99 KG/M2 | SYSTOLIC BLOOD PRESSURE: 112 MMHG

## 2021-03-22 DIAGNOSIS — N92.0 MENORRHAGIA WITH REGULAR CYCLE: Primary | ICD-10-CM

## 2021-03-22 DIAGNOSIS — Z01.411 ENCOUNTER FOR GYNECOLOGICAL EXAMINATION WITH ABNORMAL FINDING: ICD-10-CM

## 2021-03-22 LAB
B-HCG UR QL: NEGATIVE
CTP QC/QA: YES

## 2021-03-22 PROCEDURE — 3008F BODY MASS INDEX DOCD: CPT | Mod: CPTII,S$GLB,, | Performed by: OBSTETRICS & GYNECOLOGY

## 2021-03-22 PROCEDURE — 81025 POCT URINE PREGNANCY: ICD-10-PCS | Mod: S$GLB,,, | Performed by: OBSTETRICS & GYNECOLOGY

## 2021-03-22 PROCEDURE — 99999 PR PBB SHADOW E&M-EST. PATIENT-LVL II: CPT | Mod: PBBFAC,,, | Performed by: OBSTETRICS & GYNECOLOGY

## 2021-03-22 PROCEDURE — 3008F PR BODY MASS INDEX (BMI) DOCUMENTED: ICD-10-PCS | Mod: CPTII,S$GLB,, | Performed by: OBSTETRICS & GYNECOLOGY

## 2021-03-22 PROCEDURE — 99999 PR PBB SHADOW E&M-EST. PATIENT-LVL II: ICD-10-PCS | Mod: PBBFAC,,, | Performed by: OBSTETRICS & GYNECOLOGY

## 2021-03-22 PROCEDURE — 87624 HPV HI-RISK TYP POOLED RSLT: CPT | Performed by: OBSTETRICS & GYNECOLOGY

## 2021-03-22 PROCEDURE — 88175 CYTOPATH C/V AUTO FLUID REDO: CPT | Performed by: OBSTETRICS & GYNECOLOGY

## 2021-03-22 PROCEDURE — 99385 PR PREVENTIVE VISIT,NEW,18-39: ICD-10-PCS | Mod: S$GLB,,, | Performed by: OBSTETRICS & GYNECOLOGY

## 2021-03-22 PROCEDURE — 1126F AMNT PAIN NOTED NONE PRSNT: CPT | Mod: S$GLB,,, | Performed by: OBSTETRICS & GYNECOLOGY

## 2021-03-22 PROCEDURE — 1126F PR PAIN SEVERITY QUANTIFIED, NO PAIN PRESENT: ICD-10-PCS | Mod: S$GLB,,, | Performed by: OBSTETRICS & GYNECOLOGY

## 2021-03-22 PROCEDURE — 81025 URINE PREGNANCY TEST: CPT | Mod: S$GLB,,, | Performed by: OBSTETRICS & GYNECOLOGY

## 2021-03-22 PROCEDURE — 99385 PREV VISIT NEW AGE 18-39: CPT | Mod: S$GLB,,, | Performed by: OBSTETRICS & GYNECOLOGY

## 2021-03-22 RX ORDER — CYCLOBENZAPRINE HCL 10 MG
10 TABLET ORAL 3 TIMES DAILY PRN
COMMUNITY
Start: 2021-03-10 | End: 2023-05-22

## 2021-03-22 RX ORDER — MELOXICAM 15 MG
15 TABLET ORAL DAILY
COMMUNITY
Start: 2021-03-10 | End: 2022-10-24

## 2021-03-25 ENCOUNTER — PATIENT MESSAGE (OUTPATIENT)
Dept: OBSTETRICS AND GYNECOLOGY | Facility: CLINIC | Age: 31
End: 2021-03-25

## 2021-03-28 LAB
CLINICAL INFO: NORMAL
CYTO CVX: NORMAL
CYTOLOGIST CVX/VAG CYTO: NORMAL
CYTOLOGIST CVX/VAG CYTO: NORMAL
CYTOLOGY CMNT CVX/VAG CYTO-IMP: NORMAL
CYTOLOGY PAP THIN PREP EXPLANATION: NORMAL
DATE OF PREVIOUS PAP: NORMAL
DATE PREVIOUS BX: NO
HPV I/H RISK 4 DNA CVX QL NAA+PROBE: NOT DETECTED
LMP START DATE: NORMAL
MICROORGANISM CVX/VAG CYTO: NORMAL
SPECIMEN SOURCE CVX/VAG CYTO: NORMAL
STAT OF ADQ CVX/VAG CYTO-IMP: NORMAL

## 2021-04-22 ENCOUNTER — PATIENT MESSAGE (OUTPATIENT)
Dept: UROLOGY | Facility: CLINIC | Age: 31
End: 2021-04-22

## 2021-04-30 ENCOUNTER — PATIENT OUTREACH (OUTPATIENT)
Dept: ADMINISTRATIVE | Facility: OTHER | Age: 31
End: 2021-04-30

## 2021-05-05 ENCOUNTER — OFFICE VISIT (OUTPATIENT)
Dept: OBSTETRICS AND GYNECOLOGY | Facility: CLINIC | Age: 31
End: 2021-05-05
Payer: COMMERCIAL

## 2021-05-05 VITALS
SYSTOLIC BLOOD PRESSURE: 100 MMHG | WEIGHT: 165.38 LBS | DIASTOLIC BLOOD PRESSURE: 60 MMHG | BODY MASS INDEX: 25.96 KG/M2 | HEIGHT: 67 IN

## 2021-05-05 DIAGNOSIS — Z11.3 SCREEN FOR STD (SEXUALLY TRANSMITTED DISEASE): ICD-10-CM

## 2021-05-05 DIAGNOSIS — N92.0 MENORRHAGIA WITH REGULAR CYCLE: ICD-10-CM

## 2021-05-05 DIAGNOSIS — N30.00 ACUTE CYSTITIS WITHOUT HEMATURIA: Primary | ICD-10-CM

## 2021-05-05 DIAGNOSIS — R30.0 DYSURIA: ICD-10-CM

## 2021-05-05 DIAGNOSIS — N92.6 IRREGULAR MENSTRUAL CYCLE: ICD-10-CM

## 2021-05-05 PROCEDURE — 99999 PR PBB SHADOW E&M-EST. PATIENT-LVL III: ICD-10-PCS | Mod: PBBFAC,,, | Performed by: OBSTETRICS & GYNECOLOGY

## 2021-05-05 PROCEDURE — 1126F AMNT PAIN NOTED NONE PRSNT: CPT | Mod: S$GLB,,, | Performed by: OBSTETRICS & GYNECOLOGY

## 2021-05-05 PROCEDURE — 99213 PR OFFICE/OUTPT VISIT, EST, LEVL III, 20-29 MIN: ICD-10-PCS | Mod: S$GLB,,, | Performed by: OBSTETRICS & GYNECOLOGY

## 2021-05-05 PROCEDURE — 99213 OFFICE O/P EST LOW 20 MIN: CPT | Mod: S$GLB,,, | Performed by: OBSTETRICS & GYNECOLOGY

## 2021-05-05 PROCEDURE — 3008F BODY MASS INDEX DOCD: CPT | Mod: CPTII,S$GLB,, | Performed by: OBSTETRICS & GYNECOLOGY

## 2021-05-05 PROCEDURE — 87481 CANDIDA DNA AMP PROBE: CPT | Mod: 59 | Performed by: OBSTETRICS & GYNECOLOGY

## 2021-05-05 PROCEDURE — 99999 PR PBB SHADOW E&M-EST. PATIENT-LVL III: CPT | Mod: PBBFAC,,, | Performed by: OBSTETRICS & GYNECOLOGY

## 2021-05-05 PROCEDURE — 87491 CHLMYD TRACH DNA AMP PROBE: CPT | Mod: 59 | Performed by: OBSTETRICS & GYNECOLOGY

## 2021-05-05 PROCEDURE — 87086 URINE CULTURE/COLONY COUNT: CPT | Performed by: OBSTETRICS & GYNECOLOGY

## 2021-05-05 PROCEDURE — 1126F PR PAIN SEVERITY QUANTIFIED, NO PAIN PRESENT: ICD-10-PCS | Mod: S$GLB,,, | Performed by: OBSTETRICS & GYNECOLOGY

## 2021-05-05 PROCEDURE — 87591 N.GONORRHOEAE DNA AMP PROB: CPT | Performed by: OBSTETRICS & GYNECOLOGY

## 2021-05-05 PROCEDURE — 3008F PR BODY MASS INDEX (BMI) DOCUMENTED: ICD-10-PCS | Mod: CPTII,S$GLB,, | Performed by: OBSTETRICS & GYNECOLOGY

## 2021-05-06 LAB
BACTERIA UR CULT: NO GROWTH
BACTERIAL VAGINOSIS DNA: NEGATIVE
C TRACH DNA SPEC QL NAA+PROBE: NOT DETECTED
CANDIDA GLABRATA DNA: NEGATIVE
CANDIDA KRUSEI DNA: NEGATIVE
CANDIDA RRNA VAG QL PROBE: POSITIVE
N GONORRHOEA DNA SPEC QL NAA+PROBE: NOT DETECTED
T VAGINALIS RRNA GENITAL QL PROBE: NEGATIVE

## 2021-05-07 ENCOUNTER — TELEPHONE (OUTPATIENT)
Dept: OBSTETRICS AND GYNECOLOGY | Facility: CLINIC | Age: 31
End: 2021-05-07

## 2021-05-07 ENCOUNTER — PATIENT MESSAGE (OUTPATIENT)
Dept: OBSTETRICS AND GYNECOLOGY | Facility: CLINIC | Age: 31
End: 2021-05-07

## 2021-05-07 DIAGNOSIS — N89.8 VAGINAL DISCHARGE: Primary | ICD-10-CM

## 2021-05-07 RX ORDER — TERCONAZOLE 4 MG/G
1 CREAM VAGINAL NIGHTLY
Qty: 1 G | Refills: 0 | Status: SHIPPED | OUTPATIENT
Start: 2021-05-07 | End: 2021-05-14

## 2021-05-12 ENCOUNTER — OFFICE VISIT (OUTPATIENT)
Dept: URGENT CARE | Facility: CLINIC | Age: 31
End: 2021-05-12
Payer: OTHER MISCELLANEOUS

## 2021-05-12 VITALS
HEART RATE: 100 BPM | SYSTOLIC BLOOD PRESSURE: 137 MMHG | RESPIRATION RATE: 16 BRPM | TEMPERATURE: 98 F | DIASTOLIC BLOOD PRESSURE: 54 MMHG | HEIGHT: 67 IN | BODY MASS INDEX: 25.9 KG/M2 | WEIGHT: 165 LBS

## 2021-05-12 DIAGNOSIS — S90.31XA CONTUSION OF RIGHT FOOT, INITIAL ENCOUNTER: Primary | ICD-10-CM
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
## 2021-05-12 PROCEDURE — 99203 PR OFFICE/OUTPT VISIT, NEW, LEVL III, 30-44 MIN: ICD-10-PCS | Mod: S$GLB,,, | Performed by: NURSE PRACTITIONER

## 2021-05-12 PROCEDURE — 99203 OFFICE O/P NEW LOW 30 MIN: CPT | Mod: S$GLB,,, | Performed by: NURSE PRACTITIONER

## 2021-05-12 PROCEDURE — 73630 XR FOOT COMPLETE 3 VIEW RIGHT: ICD-10-PCS | Mod: RT,S$GLB,, | Performed by: RADIOLOGY

## 2021-05-12 PROCEDURE — 73630 X-RAY EXAM OF FOOT: CPT | Mod: RT,S$GLB,, | Performed by: RADIOLOGY

## 2021-05-12 RX ORDER — IBUPROFEN 200 MG
400 TABLET ORAL
Status: DISCONTINUED | OUTPATIENT
Start: 2021-05-12 | End: 2021-05-12

## 2021-05-12 RX ORDER — DEXTROMETHORPHAN HYDROBROMIDE, GUAIFENESIN 5; 100 MG/5ML; MG/5ML
650 LIQUID ORAL EVERY 8 HOURS
Refills: 0 | COMMUNITY
Start: 2021-05-12 | End: 2021-05-19 | Stop reason: SDUPTHER

## 2021-05-19 ENCOUNTER — TELEPHONE (OUTPATIENT)
Dept: OBSTETRICS AND GYNECOLOGY | Facility: CLINIC | Age: 31
End: 2021-05-19

## 2021-05-19 ENCOUNTER — OFFICE VISIT (OUTPATIENT)
Dept: URGENT CARE | Facility: CLINIC | Age: 31
End: 2021-05-19
Payer: OTHER MISCELLANEOUS

## 2021-05-19 VITALS
BODY MASS INDEX: 25.9 KG/M2 | HEART RATE: 66 BPM | HEIGHT: 67 IN | OXYGEN SATURATION: 100 % | WEIGHT: 165 LBS | DIASTOLIC BLOOD PRESSURE: 68 MMHG | RESPIRATION RATE: 16 BRPM | SYSTOLIC BLOOD PRESSURE: 124 MMHG

## 2021-05-19 DIAGNOSIS — S90.31XD CONTUSION OF RIGHT FOOT, SUBSEQUENT ENCOUNTER: Primary | ICD-10-CM
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
## 2021-05-19 PROCEDURE — 99214 OFFICE O/P EST MOD 30 MIN: CPT | Mod: S$GLB,,, | Performed by: NURSE PRACTITIONER

## 2021-05-19 PROCEDURE — 99214 PR OFFICE/OUTPT VISIT, EST, LEVL IV, 30-39 MIN: ICD-10-PCS | Mod: S$GLB,,, | Performed by: NURSE PRACTITIONER

## 2021-05-19 RX ORDER — DEXTROMETHORPHAN HYDROBROMIDE, GUAIFENESIN 5; 100 MG/5ML; MG/5ML
650 LIQUID ORAL EVERY 8 HOURS
Refills: 0 | COMMUNITY
Start: 2021-05-19 | End: 2021-05-26 | Stop reason: SDUPTHER

## 2021-05-26 ENCOUNTER — OFFICE VISIT (OUTPATIENT)
Dept: URGENT CARE | Facility: CLINIC | Age: 31
End: 2021-05-26
Payer: OTHER MISCELLANEOUS

## 2021-05-26 ENCOUNTER — LAB VISIT (OUTPATIENT)
Dept: LAB | Facility: OTHER | Age: 31
End: 2021-05-26
Attending: OBSTETRICS & GYNECOLOGY
Payer: COMMERCIAL

## 2021-05-26 ENCOUNTER — HOSPITAL ENCOUNTER (OUTPATIENT)
Dept: RADIOLOGY | Facility: OTHER | Age: 31
Discharge: HOME | End: 2021-05-26
Attending: OBSTETRICS & GYNECOLOGY
Payer: COMMERCIAL

## 2021-05-26 VITALS
SYSTOLIC BLOOD PRESSURE: 125 MMHG | OXYGEN SATURATION: 99 % | HEIGHT: 67 IN | DIASTOLIC BLOOD PRESSURE: 80 MMHG | TEMPERATURE: 97 F | WEIGHT: 165 LBS | HEART RATE: 77 BPM | BODY MASS INDEX: 25.9 KG/M2
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
## 2021-05-26 DIAGNOSIS — N92.0 MENORRHAGIA WITH REGULAR CYCLE: ICD-10-CM

## 2021-05-26 DIAGNOSIS — N92.6 IRREGULAR MENSTRUAL CYCLE: ICD-10-CM

## 2021-05-26 DIAGNOSIS — S90.31XD CONTUSION OF RIGHT FOOT, SUBSEQUENT ENCOUNTER: Primary | ICD-10-CM

## 2021-05-26 DIAGNOSIS — Z11.3 SCREEN FOR STD (SEXUALLY TRANSMITTED DISEASE): ICD-10-CM

## 2021-05-26 LAB — RPR SER QL: NORMAL

## 2021-05-26 PROCEDURE — 86703 HIV-1/HIV-2 1 RESULT ANTBDY: CPT | Performed by: OBSTETRICS & GYNECOLOGY

## 2021-05-26 PROCEDURE — 76856 US EXAM PELVIC COMPLETE: CPT | Mod: TC

## 2021-05-26 PROCEDURE — 86696 HERPES SIMPLEX TYPE 2 TEST: CPT | Performed by: OBSTETRICS & GYNECOLOGY

## 2021-05-26 PROCEDURE — 73630 X-RAY EXAM OF FOOT: CPT | Mod: RT,S$GLB,, | Performed by: RADIOLOGY

## 2021-05-26 PROCEDURE — 73630 XR FOOT COMPLETE 3 VIEW RIGHT: ICD-10-PCS | Mod: RT,S$GLB,, | Performed by: RADIOLOGY

## 2021-05-26 PROCEDURE — 76830 TRANSVAGINAL US NON-OB: CPT | Mod: 26,,, | Performed by: RADIOLOGY

## 2021-05-26 PROCEDURE — 76856 US PELVIS COMP WITH TRANSVAG NON-OB (XPD): ICD-10-PCS | Mod: 26,,, | Performed by: RADIOLOGY

## 2021-05-26 PROCEDURE — 99214 OFFICE O/P EST MOD 30 MIN: CPT | Mod: S$GLB,,, | Performed by: NURSE PRACTITIONER

## 2021-05-26 PROCEDURE — 86695 HERPES SIMPLEX TYPE 1 TEST: CPT | Performed by: OBSTETRICS & GYNECOLOGY

## 2021-05-26 PROCEDURE — 86592 SYPHILIS TEST NON-TREP QUAL: CPT | Performed by: OBSTETRICS & GYNECOLOGY

## 2021-05-26 PROCEDURE — 76856 US EXAM PELVIC COMPLETE: CPT | Mod: 26,,, | Performed by: RADIOLOGY

## 2021-05-26 PROCEDURE — 99214 PR OFFICE/OUTPT VISIT, EST, LEVL IV, 30-39 MIN: ICD-10-PCS | Mod: S$GLB,,, | Performed by: NURSE PRACTITIONER

## 2021-05-26 PROCEDURE — 76830 US PELVIS COMP WITH TRANSVAG NON-OB (XPD): ICD-10-PCS | Mod: 26,,, | Performed by: RADIOLOGY

## 2021-05-26 PROCEDURE — 36415 COLL VENOUS BLD VENIPUNCTURE: CPT | Performed by: OBSTETRICS & GYNECOLOGY

## 2021-05-26 PROCEDURE — 80074 ACUTE HEPATITIS PANEL: CPT | Performed by: OBSTETRICS & GYNECOLOGY

## 2021-05-26 RX ORDER — DEXTROMETHORPHAN HYDROBROMIDE, GUAIFENESIN 5; 100 MG/5ML; MG/5ML
650 LIQUID ORAL EVERY 8 HOURS
Refills: 0 | COMMUNITY
Start: 2021-05-26

## 2021-05-27 LAB
HAV IGM SERPL QL IA: NEGATIVE
HBV CORE IGM SERPL QL IA: NEGATIVE
HBV SURFACE AG SERPL QL IA: NEGATIVE
HCV AB SERPL QL IA: NEGATIVE
HIV 1+2 AB+HIV1 P24 AG SERPL QL IA: NEGATIVE

## 2021-05-29 LAB
HSV1 IGG SERPL QL IA: POSITIVE
HSV2 IGG SERPL QL IA: POSITIVE

## 2021-06-02 ENCOUNTER — OFFICE VISIT (OUTPATIENT)
Dept: URGENT CARE | Facility: CLINIC | Age: 31
End: 2021-06-02
Payer: OTHER MISCELLANEOUS

## 2021-06-02 VITALS
TEMPERATURE: 98 F | WEIGHT: 165 LBS | RESPIRATION RATE: 16 BRPM | DIASTOLIC BLOOD PRESSURE: 78 MMHG | BODY MASS INDEX: 25.9 KG/M2 | SYSTOLIC BLOOD PRESSURE: 119 MMHG | HEART RATE: 79 BPM | HEIGHT: 67 IN | OXYGEN SATURATION: 99 %

## 2021-06-02 DIAGNOSIS — S90.31XD CONTUSION OF RIGHT FOOT, SUBSEQUENT ENCOUNTER: Primary | ICD-10-CM
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
## 2021-06-02 PROCEDURE — 99213 PR OFFICE/OUTPT VISIT, EST, LEVL III, 20-29 MIN: ICD-10-PCS | Mod: S$GLB,,, | Performed by: NURSE PRACTITIONER

## 2021-06-02 PROCEDURE — 99213 OFFICE O/P EST LOW 20 MIN: CPT | Mod: S$GLB,,, | Performed by: NURSE PRACTITIONER

## 2021-06-10 ENCOUNTER — TELEPHONE (OUTPATIENT)
Dept: OBSTETRICS AND GYNECOLOGY | Facility: CLINIC | Age: 31
End: 2021-06-10

## 2021-06-14 ENCOUNTER — TELEPHONE (OUTPATIENT)
Dept: OBSTETRICS AND GYNECOLOGY | Facility: CLINIC | Age: 31
End: 2021-06-14

## 2021-06-16 ENCOUNTER — OFFICE VISIT (OUTPATIENT)
Dept: URGENT CARE | Facility: CLINIC | Age: 31
End: 2021-06-16

## 2021-06-16 VITALS
OXYGEN SATURATION: 98 % | RESPIRATION RATE: 16 BRPM | HEIGHT: 67 IN | WEIGHT: 165 LBS | HEART RATE: 85 BPM | TEMPERATURE: 98 F | BODY MASS INDEX: 25.9 KG/M2 | SYSTOLIC BLOOD PRESSURE: 111 MMHG | DIASTOLIC BLOOD PRESSURE: 67 MMHG

## 2021-06-16 DIAGNOSIS — S90.31XD CONTUSION OF RIGHT FOOT, SUBSEQUENT ENCOUNTER: Primary | ICD-10-CM

## 2021-06-16 PROCEDURE — 99213 OFFICE O/P EST LOW 20 MIN: CPT | Mod: S$GLB,,, | Performed by: NURSE PRACTITIONER

## 2021-06-16 PROCEDURE — 99213 PR OFFICE/OUTPT VISIT, EST, LEVL III, 20-29 MIN: ICD-10-PCS | Mod: S$GLB,,, | Performed by: NURSE PRACTITIONER

## 2021-06-21 ENCOUNTER — TELEPHONE (OUTPATIENT)
Dept: OBSTETRICS AND GYNECOLOGY | Facility: CLINIC | Age: 31
End: 2021-06-21

## 2021-08-03 ENCOUNTER — PATIENT MESSAGE (OUTPATIENT)
Dept: OBSTETRICS AND GYNECOLOGY | Facility: CLINIC | Age: 31
End: 2021-08-03

## 2021-08-04 ENCOUNTER — OCCUPATIONAL HEALTH (OUTPATIENT)
Dept: URGENT CARE | Facility: CLINIC | Age: 31
End: 2021-08-04
Payer: OTHER MISCELLANEOUS

## 2021-08-04 DIAGNOSIS — Z11.52 ENCOUNTER FOR SCREENING FOR COVID-19: Primary | ICD-10-CM

## 2021-08-04 LAB
CTP QC/QA: YES
SARS-COV-2 RDRP RESP QL NAA+PROBE: NEGATIVE

## 2021-08-04 PROCEDURE — U0002: ICD-10-PCS | Mod: QW,S$GLB,, | Performed by: PHYSICIAN ASSISTANT

## 2021-08-04 PROCEDURE — U0002 COVID-19 LAB TEST NON-CDC: HCPCS | Mod: QW,S$GLB,, | Performed by: PHYSICIAN ASSISTANT

## 2021-08-05 ENCOUNTER — OFFICE VISIT (OUTPATIENT)
Dept: OBSTETRICS AND GYNECOLOGY | Facility: CLINIC | Age: 31
End: 2021-08-05
Payer: COMMERCIAL

## 2021-08-05 VITALS
BODY MASS INDEX: 27.23 KG/M2 | DIASTOLIC BLOOD PRESSURE: 74 MMHG | WEIGHT: 173.5 LBS | HEIGHT: 67 IN | SYSTOLIC BLOOD PRESSURE: 114 MMHG

## 2021-08-05 DIAGNOSIS — R30.0 BURNING WITH URINATION: Primary | ICD-10-CM

## 2021-08-05 DIAGNOSIS — N89.8 VAGINAL DISCHARGE: ICD-10-CM

## 2021-08-05 DIAGNOSIS — R39.89 SUSPECTED UTI: ICD-10-CM

## 2021-08-05 LAB
BILIRUB SERPL-MCNC: ABNORMAL MG/DL
BLOOD URINE, POC: ABNORMAL
CLARITY, POC UA: CLEAR
COLOR, POC UA: YELLOW
GLUCOSE UR QL STRIP: ABNORMAL
KETONES UR QL STRIP: ABNORMAL
LEUKOCYTE ESTERASE URINE, POC: 1
NITRITE, POC UA: ABNORMAL
PH, POC UA: 6
PROTEIN, POC: ABNORMAL
SPECIFIC GRAVITY, POC UA: 1.01
UROBILINOGEN, POC UA: ABNORMAL

## 2021-08-05 PROCEDURE — 87491 CHLMYD TRACH DNA AMP PROBE: CPT | Performed by: NURSE PRACTITIONER

## 2021-08-05 PROCEDURE — 81002 POCT URINE DIPSTICK WITHOUT MICROSCOPE: ICD-10-PCS | Mod: S$GLB,,, | Performed by: NURSE PRACTITIONER

## 2021-08-05 PROCEDURE — 3074F PR MOST RECENT SYSTOLIC BLOOD PRESSURE < 130 MM HG: ICD-10-PCS | Mod: CPTII,S$GLB,, | Performed by: NURSE PRACTITIONER

## 2021-08-05 PROCEDURE — 3074F SYST BP LT 130 MM HG: CPT | Mod: CPTII,S$GLB,, | Performed by: NURSE PRACTITIONER

## 2021-08-05 PROCEDURE — 1159F PR MEDICATION LIST DOCUMENTED IN MEDICAL RECORD: ICD-10-PCS | Mod: CPTII,S$GLB,, | Performed by: NURSE PRACTITIONER

## 2021-08-05 PROCEDURE — 1160F RVW MEDS BY RX/DR IN RCRD: CPT | Mod: CPTII,S$GLB,, | Performed by: NURSE PRACTITIONER

## 2021-08-05 PROCEDURE — 99213 OFFICE O/P EST LOW 20 MIN: CPT | Mod: 25,S$GLB,, | Performed by: NURSE PRACTITIONER

## 2021-08-05 PROCEDURE — 87510 GARDNER VAG DNA DIR PROBE: CPT | Performed by: NURSE PRACTITIONER

## 2021-08-05 PROCEDURE — 3078F DIAST BP <80 MM HG: CPT | Mod: CPTII,S$GLB,, | Performed by: NURSE PRACTITIONER

## 2021-08-05 PROCEDURE — 1126F AMNT PAIN NOTED NONE PRSNT: CPT | Mod: CPTII,S$GLB,, | Performed by: NURSE PRACTITIONER

## 2021-08-05 PROCEDURE — 1159F MED LIST DOCD IN RCRD: CPT | Mod: CPTII,S$GLB,, | Performed by: NURSE PRACTITIONER

## 2021-08-05 PROCEDURE — 99999 PR PBB SHADOW E&M-EST. PATIENT-LVL III: ICD-10-PCS | Mod: PBBFAC,,, | Performed by: NURSE PRACTITIONER

## 2021-08-05 PROCEDURE — 99213 PR OFFICE/OUTPT VISIT, EST, LEVL III, 20-29 MIN: ICD-10-PCS | Mod: 25,S$GLB,, | Performed by: NURSE PRACTITIONER

## 2021-08-05 PROCEDURE — 3008F BODY MASS INDEX DOCD: CPT | Mod: CPTII,S$GLB,, | Performed by: NURSE PRACTITIONER

## 2021-08-05 PROCEDURE — 99999 PR PBB SHADOW E&M-EST. PATIENT-LVL III: CPT | Mod: PBBFAC,,, | Performed by: NURSE PRACTITIONER

## 2021-08-05 PROCEDURE — 3008F PR BODY MASS INDEX (BMI) DOCUMENTED: ICD-10-PCS | Mod: CPTII,S$GLB,, | Performed by: NURSE PRACTITIONER

## 2021-08-05 PROCEDURE — 81002 URINALYSIS NONAUTO W/O SCOPE: CPT | Mod: S$GLB,,, | Performed by: NURSE PRACTITIONER

## 2021-08-05 PROCEDURE — 3078F PR MOST RECENT DIASTOLIC BLOOD PRESSURE < 80 MM HG: ICD-10-PCS | Mod: CPTII,S$GLB,, | Performed by: NURSE PRACTITIONER

## 2021-08-05 PROCEDURE — 87591 N.GONORRHOEAE DNA AMP PROB: CPT | Performed by: NURSE PRACTITIONER

## 2021-08-05 PROCEDURE — 1160F PR REVIEW ALL MEDS BY PRESCRIBER/CLIN PHARMACIST DOCUMENTED: ICD-10-PCS | Mod: CPTII,S$GLB,, | Performed by: NURSE PRACTITIONER

## 2021-08-05 PROCEDURE — 87086 URINE CULTURE/COLONY COUNT: CPT | Performed by: NURSE PRACTITIONER

## 2021-08-05 PROCEDURE — 1126F PR PAIN SEVERITY QUANTIFIED, NO PAIN PRESENT: ICD-10-PCS | Mod: CPTII,S$GLB,, | Performed by: NURSE PRACTITIONER

## 2021-08-05 PROCEDURE — 87660 TRICHOMONAS VAGIN DIR PROBE: CPT | Performed by: NURSE PRACTITIONER

## 2021-08-05 RX ORDER — FLUCONAZOLE 150 MG/1
150 TABLET ORAL DAILY
Qty: 1 TABLET | Refills: 0 | Status: SHIPPED | OUTPATIENT
Start: 2021-08-05 | End: 2021-08-06

## 2021-08-05 RX ORDER — NITROFURANTOIN 25; 75 MG/1; MG/1
100 CAPSULE ORAL 2 TIMES DAILY
Qty: 10 CAPSULE | Refills: 0 | Status: SHIPPED | OUTPATIENT
Start: 2021-08-05 | End: 2021-08-10

## 2021-08-07 LAB
CANDIDA RRNA VAG QL PROBE: NEGATIVE
G VAGINALIS RRNA GENITAL QL PROBE: NEGATIVE
T VAGINALIS RRNA GENITAL QL PROBE: NEGATIVE

## 2021-08-08 LAB
BACTERIA UR CULT: NORMAL
BACTERIA UR CULT: NORMAL

## 2021-08-09 ENCOUNTER — PATIENT MESSAGE (OUTPATIENT)
Dept: OBSTETRICS AND GYNECOLOGY | Facility: CLINIC | Age: 31
End: 2021-08-09

## 2021-08-09 LAB
C TRACH DNA SPEC QL NAA+PROBE: NOT DETECTED
N GONORRHOEA DNA SPEC QL NAA+PROBE: NOT DETECTED

## 2021-08-12 ENCOUNTER — PATIENT MESSAGE (OUTPATIENT)
Dept: OBSTETRICS AND GYNECOLOGY | Facility: CLINIC | Age: 31
End: 2021-08-12

## 2021-08-24 ENCOUNTER — PROCEDURE VISIT (OUTPATIENT)
Dept: OBSTETRICS AND GYNECOLOGY | Facility: CLINIC | Age: 31
End: 2021-08-24
Payer: COMMERCIAL

## 2021-08-24 VITALS
DIASTOLIC BLOOD PRESSURE: 60 MMHG | WEIGHT: 172.63 LBS | HEIGHT: 67 IN | SYSTOLIC BLOOD PRESSURE: 118 MMHG | BODY MASS INDEX: 27.09 KG/M2

## 2021-08-24 DIAGNOSIS — N92.0 MENORRHAGIA WITH REGULAR CYCLE: Primary | ICD-10-CM

## 2021-08-24 DIAGNOSIS — N84.0 ENDOMETRIAL POLYP: ICD-10-CM

## 2021-08-24 LAB
B-HCG UR QL: NEGATIVE
CTP QC/QA: YES

## 2021-08-24 PROCEDURE — 88305 TISSUE EXAM BY PATHOLOGIST: CPT | Mod: 26,,, | Performed by: PATHOLOGY

## 2021-08-24 PROCEDURE — 81025 POCT URINE PREGNANCY: ICD-10-PCS | Mod: S$GLB,,, | Performed by: OBSTETRICS & GYNECOLOGY

## 2021-08-24 PROCEDURE — 81025 URINE PREGNANCY TEST: CPT | Mod: S$GLB,,, | Performed by: OBSTETRICS & GYNECOLOGY

## 2021-08-24 PROCEDURE — 58100 BIOPSY OF UTERUS LINING: CPT | Mod: S$GLB,,, | Performed by: OBSTETRICS & GYNECOLOGY

## 2021-08-24 PROCEDURE — 88305 TISSUE EXAM BY PATHOLOGIST: ICD-10-PCS | Mod: 26,,, | Performed by: PATHOLOGY

## 2021-08-24 PROCEDURE — 58100 BIOPSY (GYNECOLOGICAL): ICD-10-PCS | Mod: S$GLB,,, | Performed by: OBSTETRICS & GYNECOLOGY

## 2021-08-24 PROCEDURE — 88305 TISSUE EXAM BY PATHOLOGIST: CPT | Performed by: PATHOLOGY

## 2021-08-27 ENCOUNTER — PATIENT MESSAGE (OUTPATIENT)
Dept: OBSTETRICS AND GYNECOLOGY | Facility: CLINIC | Age: 31
End: 2021-08-27

## 2021-08-27 ENCOUNTER — TELEPHONE (OUTPATIENT)
Dept: OBSTETRICS AND GYNECOLOGY | Facility: CLINIC | Age: 31
End: 2021-08-27

## 2021-08-27 ENCOUNTER — LAB VISIT (OUTPATIENT)
Dept: LAB | Facility: HOSPITAL | Age: 31
End: 2021-08-27
Attending: OBSTETRICS & GYNECOLOGY
Payer: COMMERCIAL

## 2021-08-27 DIAGNOSIS — R30.0 BURNING WITH URINATION: Primary | ICD-10-CM

## 2021-08-27 DIAGNOSIS — R30.0 BURNING WITH URINATION: ICD-10-CM

## 2021-08-27 PROCEDURE — 87086 URINE CULTURE/COLONY COUNT: CPT | Performed by: OBSTETRICS & GYNECOLOGY

## 2021-08-29 LAB — BACTERIA UR CULT: NORMAL

## 2021-09-01 LAB
FINAL PATHOLOGIC DIAGNOSIS: NORMAL
GROSS: NORMAL
Lab: NORMAL

## 2021-09-07 ENCOUNTER — TELEPHONE (OUTPATIENT)
Dept: OBSTETRICS AND GYNECOLOGY | Facility: CLINIC | Age: 31
End: 2021-09-07

## 2021-10-04 ENCOUNTER — PATIENT MESSAGE (OUTPATIENT)
Dept: ADMINISTRATIVE | Facility: HOSPITAL | Age: 31
End: 2021-10-04

## 2021-12-01 ENCOUNTER — PATIENT MESSAGE (OUTPATIENT)
Dept: FAMILY MEDICINE | Facility: CLINIC | Age: 31
End: 2021-12-01
Payer: OTHER MISCELLANEOUS

## 2021-12-01 ENCOUNTER — PATIENT MESSAGE (OUTPATIENT)
Dept: OBSTETRICS AND GYNECOLOGY | Facility: CLINIC | Age: 31
End: 2021-12-01
Payer: OTHER MISCELLANEOUS

## 2021-12-02 ENCOUNTER — OFFICE VISIT (OUTPATIENT)
Dept: FAMILY MEDICINE | Facility: CLINIC | Age: 31
End: 2021-12-02
Payer: COMMERCIAL

## 2021-12-02 VITALS
BODY MASS INDEX: 26.85 KG/M2 | RESPIRATION RATE: 16 BRPM | SYSTOLIC BLOOD PRESSURE: 108 MMHG | DIASTOLIC BLOOD PRESSURE: 70 MMHG | HEART RATE: 76 BPM | OXYGEN SATURATION: 98 % | TEMPERATURE: 98 F | HEIGHT: 67 IN | WEIGHT: 171.06 LBS

## 2021-12-02 DIAGNOSIS — N89.8 VAGINAL DISCHARGE: ICD-10-CM

## 2021-12-02 DIAGNOSIS — N30.00 ACUTE CYSTITIS WITHOUT HEMATURIA: Primary | ICD-10-CM

## 2021-12-02 LAB
BILIRUB SERPL-MCNC: ABNORMAL MG/DL
BLOOD URINE, POC: ABNORMAL
CLARITY, POC UA: ABNORMAL
COLOR, POC UA: YELLOW
GLUCOSE UR QL STRIP: NORMAL
KETONES UR QL STRIP: ABNORMAL
LEUKOCYTE ESTERASE URINE, POC: ABNORMAL
NITRITE, POC UA: NEGATIVE
PH, POC UA: 5
PROTEIN, POC: ABNORMAL
SPECIFIC GRAVITY, POC UA: 1
UROBILINOGEN, POC UA: NORMAL

## 2021-12-02 PROCEDURE — 87491 CHLMYD TRACH DNA AMP PROBE: CPT | Mod: 59 | Performed by: PHYSICIAN ASSISTANT

## 2021-12-02 PROCEDURE — 87086 URINE CULTURE/COLONY COUNT: CPT | Performed by: PHYSICIAN ASSISTANT

## 2021-12-02 PROCEDURE — 87481 CANDIDA DNA AMP PROBE: CPT | Mod: 59 | Performed by: PHYSICIAN ASSISTANT

## 2021-12-02 PROCEDURE — 99214 PR OFFICE/OUTPT VISIT, EST, LEVL IV, 30-39 MIN: ICD-10-PCS | Mod: S$PBB,,, | Performed by: PHYSICIAN ASSISTANT

## 2021-12-02 PROCEDURE — 99999 PR PBB SHADOW E&M-EST. PATIENT-LVL III: CPT | Mod: PBBFAC,,, | Performed by: PHYSICIAN ASSISTANT

## 2021-12-02 PROCEDURE — 99213 OFFICE O/P EST LOW 20 MIN: CPT | Mod: PBBFAC,PO | Performed by: PHYSICIAN ASSISTANT

## 2021-12-02 PROCEDURE — 87591 N.GONORRHOEAE DNA AMP PROB: CPT | Mod: 59 | Performed by: PHYSICIAN ASSISTANT

## 2021-12-02 PROCEDURE — 99999 PR PBB SHADOW E&M-EST. PATIENT-LVL III: ICD-10-PCS | Mod: PBBFAC,,, | Performed by: PHYSICIAN ASSISTANT

## 2021-12-02 PROCEDURE — 81002 URINALYSIS NONAUTO W/O SCOPE: CPT | Mod: PBBFAC,PO | Performed by: PHYSICIAN ASSISTANT

## 2021-12-02 PROCEDURE — 99214 OFFICE O/P EST MOD 30 MIN: CPT | Mod: S$PBB,,, | Performed by: PHYSICIAN ASSISTANT

## 2021-12-02 RX ORDER — SULFAMETHOXAZOLE AND TRIMETHOPRIM 800; 160 MG/1; MG/1
1 TABLET ORAL 2 TIMES DAILY
Qty: 6 TABLET | Refills: 0 | Status: SHIPPED | OUTPATIENT
Start: 2021-12-02 | End: 2021-12-05

## 2021-12-04 LAB
BACTERIA UR CULT: NORMAL
BACTERIA UR CULT: NORMAL

## 2021-12-06 ENCOUNTER — PATIENT MESSAGE (OUTPATIENT)
Dept: FAMILY MEDICINE | Facility: CLINIC | Age: 31
End: 2021-12-06
Payer: OTHER MISCELLANEOUS

## 2021-12-07 LAB
BACTERIAL VAGINOSIS DNA: NEGATIVE
C TRACH DNA SPEC QL NAA+PROBE: NOT DETECTED
CANDIDA GLABRATA DNA: NEGATIVE
CANDIDA KRUSEI DNA: NEGATIVE
CANDIDA RRNA VAG QL PROBE: POSITIVE
N GONORRHOEA DNA SPEC QL NAA+PROBE: NOT DETECTED
T VAGINALIS RRNA GENITAL QL PROBE: NEGATIVE

## 2021-12-08 ENCOUNTER — PATIENT MESSAGE (OUTPATIENT)
Dept: FAMILY MEDICINE | Facility: CLINIC | Age: 31
End: 2021-12-08
Payer: OTHER MISCELLANEOUS

## 2021-12-08 DIAGNOSIS — B37.31 VAGINAL YEAST INFECTION: Primary | ICD-10-CM

## 2021-12-08 RX ORDER — FLUCONAZOLE 150 MG/1
150 TABLET ORAL ONCE
Qty: 1 TABLET | Refills: 0 | Status: SHIPPED | OUTPATIENT
Start: 2021-12-08 | End: 2021-12-08

## 2021-12-15 ENCOUNTER — PATIENT OUTREACH (OUTPATIENT)
Dept: ADMINISTRATIVE | Facility: OTHER | Age: 31
End: 2021-12-15
Payer: OTHER MISCELLANEOUS

## 2022-01-14 ENCOUNTER — TELEPHONE (OUTPATIENT)
Dept: OBSTETRICS AND GYNECOLOGY | Facility: CLINIC | Age: 32
End: 2022-01-14
Payer: OTHER MISCELLANEOUS

## 2022-01-14 NOTE — TELEPHONE ENCOUNTER
[place] and could not stay on her phone long so she will call back once [place] get [place] to reschedule her appt.

## 2022-07-29 ENCOUNTER — PATIENT OUTREACH (OUTPATIENT)
Dept: ADMINISTRATIVE | Facility: HOSPITAL | Age: 32
End: 2022-07-29
Payer: OTHER MISCELLANEOUS

## 2022-09-21 ENCOUNTER — TELEPHONE (OUTPATIENT)
Dept: OBSTETRICS AND GYNECOLOGY | Facility: CLINIC | Age: 32
End: 2022-09-21
Payer: OTHER MISCELLANEOUS

## 2022-09-21 NOTE — TELEPHONE ENCOUNTER
----- Message from Shai Zarate sent at 9/21/2022 11:11 AM CDT -----  Contact: pt  Type:  Sooner Apoointment Request    Caller is requesting a sooner appointment.  Caller declined first available appointment listed below.  Caller will not accept being placed on the waitlist and is requesting a message be sent to doctor.  Name of Caller:PT  When is the first available appointment? N/A  Symptoms:ANNUAL   Would the patient rather a call back or a response via [facility]? CALL   Best Call Back Number:[phone]  Additional Information:

## 2022-09-21 NOTE — TELEPHONE ENCOUNTER
Returned call to the patient. Patient agreed to a annual with ORION Shirley on 9/26 at 11am and a consult with Dr. Mclean on 12/29 at 9:45am at [facility].

## 2022-09-26 ENCOUNTER — LAB VISIT (OUTPATIENT)
Dept: LAB | Facility: HOSPITAL | Age: 32
End: 2022-09-26
Attending: NURSE PRACTITIONER
Payer: COMMERCIAL

## 2022-09-26 ENCOUNTER — OFFICE VISIT (OUTPATIENT)
Dept: OBSTETRICS AND GYNECOLOGY | Facility: CLINIC | Age: 32
End: 2022-09-26
Payer: COMMERCIAL

## 2022-09-26 VITALS
DIASTOLIC BLOOD PRESSURE: 78 MMHG | SYSTOLIC BLOOD PRESSURE: 125 MMHG | BODY MASS INDEX: 27.42 KG/M2 | WEIGHT: 175.06 LBS

## 2022-09-26 DIAGNOSIS — Z11.3 SCREENING EXAMINATION FOR SEXUALLY TRANSMITTED DISEASE: ICD-10-CM

## 2022-09-26 DIAGNOSIS — N91.4 SECONDARY OLIGOMENORRHEA: ICD-10-CM

## 2022-09-26 DIAGNOSIS — Z12.4 ENCOUNTER FOR PAPANICOLAOU SMEAR FOR CERVICAL CANCER SCREENING: ICD-10-CM

## 2022-09-26 DIAGNOSIS — Z01.419 WELL WOMAN EXAM: Primary | ICD-10-CM

## 2022-09-26 LAB
FSH SERPL-ACNC: 6.74 MIU/ML
HAV IGM SERPL QL IA: NORMAL
HBV CORE IGM SERPL QL IA: NORMAL
HBV SURFACE AG SERPL QL IA: NORMAL
HCG INTACT+B SERPL-ACNC: <1.2 MIU/ML
HCV AB SERPL QL IA: NORMAL
HIV 1+2 AB+HIV1 P24 AG SERPL QL IA: NORMAL
PROLACTIN SERPL IA-MCNC: 5.4 NG/ML (ref 5.2–26.5)
RPR SER QL: NORMAL
T4 FREE SERPL-MCNC: 0.92 NG/DL (ref 0.71–1.51)
TSH SERPL DL<=0.005 MIU/L-ACNC: 1.49 UIU/ML (ref 0.4–4)

## 2022-09-26 PROCEDURE — 84439 ASSAY OF FREE THYROXINE: CPT | Performed by: NURSE PRACTITIONER

## 2022-09-26 PROCEDURE — 87491 CHLMYD TRACH DNA AMP PROBE: CPT | Performed by: NURSE PRACTITIONER

## 2022-09-26 PROCEDURE — 1160F RVW MEDS BY RX/DR IN RCRD: CPT | Mod: CPTII,S$GLB,, | Performed by: NURSE PRACTITIONER

## 2022-09-26 PROCEDURE — 86592 SYPHILIS TEST NON-TREP QUAL: CPT | Performed by: NURSE PRACTITIONER

## 2022-09-26 PROCEDURE — 1159F PR MEDICATION LIST DOCUMENTED IN MEDICAL RECORD: ICD-10-PCS | Mod: CPTII,S$GLB,, | Performed by: NURSE PRACTITIONER

## 2022-09-26 PROCEDURE — 3008F BODY MASS INDEX DOCD: CPT | Mod: CPTII,S$GLB,, | Performed by: NURSE PRACTITIONER

## 2022-09-26 PROCEDURE — 80074 ACUTE HEPATITIS PANEL: CPT | Performed by: NURSE PRACTITIONER

## 2022-09-26 PROCEDURE — 3074F PR MOST RECENT SYSTOLIC BLOOD PRESSURE < 130 MM HG: ICD-10-PCS | Mod: CPTII,S$GLB,, | Performed by: NURSE PRACTITIONER

## 2022-09-26 PROCEDURE — 84702 CHORIONIC GONADOTROPIN TEST: CPT | Performed by: NURSE PRACTITIONER

## 2022-09-26 PROCEDURE — 3078F DIAST BP <80 MM HG: CPT | Mod: CPTII,S$GLB,, | Performed by: NURSE PRACTITIONER

## 2022-09-26 PROCEDURE — 83001 ASSAY OF GONADOTROPIN (FSH): CPT | Performed by: NURSE PRACTITIONER

## 2022-09-26 PROCEDURE — 84402 ASSAY OF FREE TESTOSTERONE: CPT | Performed by: NURSE PRACTITIONER

## 2022-09-26 PROCEDURE — 87591 N.GONORRHOEAE DNA AMP PROB: CPT | Performed by: NURSE PRACTITIONER

## 2022-09-26 PROCEDURE — 87389 HIV-1 AG W/HIV-1&-2 AB AG IA: CPT | Performed by: NURSE PRACTITIONER

## 2022-09-26 PROCEDURE — 3074F SYST BP LT 130 MM HG: CPT | Mod: CPTII,S$GLB,, | Performed by: NURSE PRACTITIONER

## 2022-09-26 PROCEDURE — 1159F MED LIST DOCD IN RCRD: CPT | Mod: CPTII,S$GLB,, | Performed by: NURSE PRACTITIONER

## 2022-09-26 PROCEDURE — 99395 PREV VISIT EST AGE 18-39: CPT | Mod: S$GLB,,, | Performed by: NURSE PRACTITIONER

## 2022-09-26 PROCEDURE — 88175 CYTOPATH C/V AUTO FLUID REDO: CPT | Performed by: NURSE PRACTITIONER

## 2022-09-26 PROCEDURE — 3078F PR MOST RECENT DIASTOLIC BLOOD PRESSURE < 80 MM HG: ICD-10-PCS | Mod: CPTII,S$GLB,, | Performed by: NURSE PRACTITIONER

## 2022-09-26 PROCEDURE — 3008F PR BODY MASS INDEX (BMI) DOCUMENTED: ICD-10-PCS | Mod: CPTII,S$GLB,, | Performed by: NURSE PRACTITIONER

## 2022-09-26 PROCEDURE — 99395 PR PREVENTIVE VISIT,EST,18-39: ICD-10-PCS | Mod: S$GLB,,, | Performed by: NURSE PRACTITIONER

## 2022-09-26 PROCEDURE — 87624 HPV HI-RISK TYP POOLED RSLT: CPT | Performed by: NURSE PRACTITIONER

## 2022-09-26 PROCEDURE — 99999 PR PBB SHADOW E&M-EST. PATIENT-LVL III: CPT | Mod: PBBFAC,,, | Performed by: NURSE PRACTITIONER

## 2022-09-26 PROCEDURE — 84443 ASSAY THYROID STIM HORMONE: CPT | Performed by: NURSE PRACTITIONER

## 2022-09-26 PROCEDURE — 99999 PR PBB SHADOW E&M-EST. PATIENT-LVL III: ICD-10-PCS | Mod: PBBFAC,,, | Performed by: NURSE PRACTITIONER

## 2022-09-26 PROCEDURE — 1160F PR REVIEW ALL MEDS BY PRESCRIBER/CLIN PHARMACIST DOCUMENTED: ICD-10-PCS | Mod: CPTII,S$GLB,, | Performed by: NURSE PRACTITIONER

## 2022-09-26 PROCEDURE — 36415 COLL VENOUS BLD VENIPUNCTURE: CPT | Performed by: NURSE PRACTITIONER

## 2022-09-26 PROCEDURE — 84146 ASSAY OF PROLACTIN: CPT | Performed by: NURSE PRACTITIONER

## 2022-09-26 NOTE — PROGRESS NOTES
CC: Annual    HPI: Pt is a 31 y.o. female who presents for routine annual exam.     Has not had a cycle since 2022, reports a history of abnormal cycles, has tried OCP and controlling with diet and exercise.  2021 pelvic u/s =4 mm hyperechoic focus within the endometrial canal, 2021 endometrial biopsy= benign.  This may represent an endometrial polyp.6 mm hyperechoic lesion in the right ovary.  This may represent a dermoid or involuting follicle.  Follow-up as clinically indicated.    PAP: 3/2021= negative, HPV= negative, denies history of abnormal PAPs, would like PAP today    Contraception: declines   STD screening- would like vaginal and blood testing   Exercise: cardio and weights   Smoking history: no  Wears seatbelts    Denies domestic violence     History reviewed. No pertinent past medical history.    Past Surgical History:   Procedure Laterality Date    DIAGNOSTIC LAPAROSCOPY      KNEE ARTHROPLASTY Right        OB History    Para Term  AB Living   0 0 0 0 0 0   SAB IAB Ectopic Multiple Live Births   0 0 0 0 0       Family History   Problem Relation Age of Onset    Hypertension Mother     Blindness Neg Hx     Amblyopia Neg Hx     Cancer Neg Hx     Cataracts Neg Hx     Diabetes Neg Hx     Glaucoma Neg Hx     Macular degeneration Neg Hx     Retinal detachment Neg Hx     Strabismus Neg Hx     Stroke Neg Hx     Thyroid disease Neg Hx     Breast cancer Neg Hx     Colon cancer Neg Hx     Ovarian cancer Neg Hx        Social History     Tobacco Use    Smoking status: Never    Smokeless tobacco: Never   Substance Use Topics    Alcohol use: No    Drug use: No       /78   Wt 79.4 kg (175 lb 0.7 oz)   LMP 07/15/2022 (Approximate)   BMI 27.42 kg/m²       ROS:  GENERAL: Feeling well overall. Denies fever or chills.   SKIN: Denies rash or lesions.   HEAD: Denies head injury or headache.   NODES: Denies enlarged lymph nodes.   CHEST: Denies chest pain or shortness of breath.   CARDIOVASCULAR:  Denies palpitations or left sided chest pain.   ABDOMEN: No abdominal pain, constipation, diarrhea, nausea, vomiting or rectal bleeding.   URINARY: No dysuria, hematuria, or burning on urination.  REPRODUCTIVE: See HPI.   BREASTS: Denies pain, lumps, or nipple discharge.   HEMATOLOGIC: No easy bruisability or excessive bleeding.   MUSCULOSKELETAL: Denies joint pain or swelling.   NEUROLOGIC: Denies syncope or weakness.   PSYCHIATRIC: Denies depression, anxiety or mood swings.    PE:   APPEARANCE: Well nourished, well developed, in no acute distress.  AFFECT: WNL, alert and oriented x 3  SKIN: No acne or hirsutism  NECK: Neck symmetric  NODES: No inguinal, cervical, axillary, or femoral lymph node enlargement  CHEST: Good respiratory effect  ABDOMEN: Soft.  No tenderness or masses. Nondistended.  BREASTS: Symmetrical, no skin changes or visible lesions.  No palpable masses, nipple discharge bilaterally.  PELVIC: Normal external genitalia without lesions.  Normal hair distribution.  Adequate perineal body, normal urethral meatus.  Vagina moist and well rugated without lesions or discharge.  Cervix pink, with multiple flesh colored granulation appearing lesions, no discharge or tenderness.  No significant cystocele or rectocele.  Bimanual exam shows uterus to be normal size, regular, mobile and nontender.  Adnexa without masses or tenderness.    Anus Perineum:No lesions, no relaxation, no external hemorrhoids.  EXTREMITIES: No edema.      ASSESSMENT AND PLAN  1. Well woman exam        2. Encounter for Papanicolaou smear for cervical cancer screening  HPV High Risk Genotypes, PCR    Liquid-Based Pap Smear, Screening      3. Screening examination for sexually transmitted disease  HIV 1/2 Ag/Ab (4th Gen)    RPR    Hepatitis Panel, Acute    C. trachomatis/N. gonorrhoeae by AMP DNA      4. Secondary oligomenorrhea  Follicle Stimulating Hormone    HCG, Quantitative    Prolactin    Testosterone, Free    T4, Free    TSH    US  Pelvis Comp with Transvag NON-OB (xpd        Up to date Oligomenorrhea work up as above, discussed possible provera challenge pending results she has f/u appointment scheduled with Adriel Nguyễn and Cee.     Patient was counseled today on A.C.S. Pap guidelines and recommendations for yearly pelvic exams, mammograms and monthly self breast exams; to see her PCP for other health maintenance.     All questions answered, patient verbalizes understanding.     Follow-up with in 1 year for routine exam and PRN.

## 2022-09-28 LAB
C TRACH DNA SPEC QL NAA+PROBE: NOT DETECTED
N GONORRHOEA DNA SPEC QL NAA+PROBE: NOT DETECTED

## 2022-09-29 LAB — TESTOST FREE SERPL-MCNC: 2.3 PG/ML

## 2022-10-05 ENCOUNTER — HOSPITAL ENCOUNTER (OUTPATIENT)
Dept: RADIOLOGY | Facility: HOSPITAL | Age: 32
Discharge: HOME | End: 2022-10-05
Attending: NURSE PRACTITIONER
Payer: COMMERCIAL

## 2022-10-05 DIAGNOSIS — N91.4 SECONDARY OLIGOMENORRHEA: ICD-10-CM

## 2022-10-06 ENCOUNTER — HOSPITAL ENCOUNTER (OUTPATIENT)
Dept: RADIOLOGY | Facility: HOSPITAL | Age: 32
Discharge: HOME | End: 2022-10-06
Attending: NURSE PRACTITIONER
Payer: COMMERCIAL

## 2022-10-06 PROCEDURE — 76830 TRANSVAGINAL US NON-OB: CPT | Mod: TC,PO

## 2022-10-11 ENCOUNTER — PATIENT MESSAGE (OUTPATIENT)
Dept: OBSTETRICS AND GYNECOLOGY | Facility: CLINIC | Age: 32
End: 2022-10-11
Payer: COMMERCIAL

## 2022-10-19 ENCOUNTER — PATIENT MESSAGE (OUTPATIENT)
Dept: OBSTETRICS AND GYNECOLOGY | Facility: CLINIC | Age: 32
End: 2022-10-19
Payer: COMMERCIAL

## 2022-10-21 ENCOUNTER — TELEPHONE (OUTPATIENT)
Dept: OBSTETRICS AND GYNECOLOGY | Facility: CLINIC | Age: 32
End: 2022-10-21
Payer: COMMERCIAL

## 2022-10-21 ENCOUNTER — PATIENT MESSAGE (OUTPATIENT)
Dept: OBSTETRICS AND GYNECOLOGY | Facility: CLINIC | Age: 32
End: 2022-10-21

## 2022-10-21 ENCOUNTER — TELEPHONE (OUTPATIENT)
Dept: OBSTETRICS AND GYNECOLOGY | Facility: CLINIC | Age: 32
End: 2022-10-21

## 2022-10-21 ENCOUNTER — OFFICE VISIT (OUTPATIENT)
Dept: OBSTETRICS AND GYNECOLOGY | Facility: CLINIC | Age: 32
End: 2022-10-21
Payer: COMMERCIAL

## 2022-10-21 DIAGNOSIS — N83.209 CYST OF OVARY, UNSPECIFIED LATERALITY: Primary | ICD-10-CM

## 2022-10-21 PROCEDURE — 1160F PR REVIEW ALL MEDS BY PRESCRIBER/CLIN PHARMACIST DOCUMENTED: ICD-10-PCS | Mod: CPTII,95,, | Performed by: OBSTETRICS & GYNECOLOGY

## 2022-10-21 PROCEDURE — 1160F RVW MEDS BY RX/DR IN RCRD: CPT | Mod: CPTII,95,, | Performed by: OBSTETRICS & GYNECOLOGY

## 2022-10-21 PROCEDURE — 99213 OFFICE O/P EST LOW 20 MIN: CPT | Mod: 95,,, | Performed by: OBSTETRICS & GYNECOLOGY

## 2022-10-21 PROCEDURE — 1159F PR MEDICATION LIST DOCUMENTED IN MEDICAL RECORD: ICD-10-PCS | Mod: CPTII,95,, | Performed by: OBSTETRICS & GYNECOLOGY

## 2022-10-21 PROCEDURE — 99213 PR OFFICE/OUTPT VISIT, EST, LEVL III, 20-29 MIN: ICD-10-PCS | Mod: 95,,, | Performed by: OBSTETRICS & GYNECOLOGY

## 2022-10-21 PROCEDURE — 1159F MED LIST DOCD IN RCRD: CPT | Mod: CPTII,95,, | Performed by: OBSTETRICS & GYNECOLOGY

## 2022-10-21 NOTE — TELEPHONE ENCOUNTER
----- Message from Shaicaroline Zarate sent at 10/21/2022  2:14 PM CDT -----  Contact: pt  Type:  Patient Returning Call    Who Called:PT   Who Left Message for Patient:Marcie Nunez MA  Does the patient know what this is regarding?:Yes  Would the patient rather a call back or a response via [facility]? call  Best Call Back Number:[phone]  Additional Information: PT virtual is scheduled today at 1:45pm

## 2022-10-21 NOTE — PROGRESS NOTES
The patient location is: [place]  The chief complaint leading to consultation is: ovarian cyst    Visit type: audiovisual    Face to Face time with patient: 15 minutes  25 minutes of total time spent on the encounter, which includes face to face time and non-face to face time preparing to see the patient (eg, review of tests), Obtaining and/or reviewing separately obtained history, Documenting clinical information in the electronic or other health record, Independently interpreting results (not separately reported) and communicating results to the patient/family/caregiver, or Care coordination (not separately reported).         Each patient to whom he or she provides medical services by telemedicine is:  (1) informed of the relationship between the physician and patient and the respective role of any other health care provider with respect to management of the patient; and (2) notified that he or she may decline to receive medical services by telemedicine and may withdraw from such care at any time.    Notes:      CC: Symptoms related to fibroids    Unique Cody is a 31 y.o. female  presents for a consultation for management of an ovarian cyst    Patient was seen for her WWE and reported oligomenorrhea, she had not had a cycle for 3 months. As part of the evaluation. She had an ultrasound, which showed:    FINDINGS:  The uterus is normal in appearance.  It measures 9.4 cm in craniocaudal dimension by 4.2 cm in AP dimension by 4.9 cm in mediolateral dimension.  The endometrial stripe thickness measures 11 mm.     The left ovary is enlarged.  It measures 7.5 cm by 6.1 cm x 5.8 cm.  It has arterial flow with a peak systolic velocity of 14 centimeters/second.  There has been interval development of a 5.5 cm cyst in the left ovary that has a thin septation associated with it.  The right ovary is normal in appearance.  It measures 4.5 cm x 1.7 cm x 1.7 cm.  It has arterial flow with a peak systolic velocity of  11 centimeters/second.     The urinary bladder is normal in appearance.  There is no free fluid visualized within the pelvis.     Impression:     1. The left ovary is enlarged. It measures 7.5 cm by 6.1 cm x 5.8 cm. There has been interval development of a 5.5 cm cyst in the left ovary that has a thin septation associated with it.  An enlarged ovary has a higher risk factor for a torsion.  2. Otherwise, normal study.       She has since had a spontaneous cycle. She reported pain 2 days before the cycle but pain has resolved. She denies nausea, vomiting.    No past medical history on file.    Past Surgical History:   Procedure Laterality Date    DIAGNOSTIC LAPAROSCOPY      KNEE ARTHROPLASTY Right        Family History   Problem Relation Age of Onset    Hypertension Mother     Blindness Neg Hx     Amblyopia Neg Hx     Cancer Neg Hx     Cataracts Neg Hx     Diabetes Neg Hx     Glaucoma Neg Hx     Macular degeneration Neg Hx     Retinal detachment Neg Hx     Strabismus Neg Hx     Stroke Neg Hx     Thyroid disease Neg Hx     Breast cancer Neg Hx     Colon cancer Neg Hx     Ovarian cancer Neg Hx        Social History     Tobacco Use    Smoking status: Never    Smokeless tobacco: Never   Substance Use Topics    Alcohol use: No    Drug use: No       OB History    Para Term  AB Living   0 0 0 0 0 0   SAB IAB Ectopic Multiple Live Births   0 0 0 0 0       There were no vitals taken for this visit.    ROS:  GENERAL: Denies weight gain or weight loss. Feeling well overall.   SKIN: Denies rash or lesions.   HEAD: Denies head injury or headache.   NODES: Denies enlarged lymph nodes.   CHEST: Denies chest pain or shortness of breath.   CARDIOVASCULAR: Denies palpitations or left sided chest pain.   ABDOMEN: No abdominal pain, constipation, diarrhea, nausea, vomiting or rectal bleeding.   URINARY: No frequency, dysuria, hematuria, or burning on urination.  REPRODUCTIVE: See HPI.   BREASTS: The patient performs breast  self-examination and denies pain, lumps, or nipple discharge.   HEMATOLOGIC: No easy bruisability or excessive bleeding with the exception of menstrual cycles.  MUSCULOSKELETAL: Denies joint pain or swelling.   NEUROLOGIC: Denies syncope or weakness.   PSYCHIATRIC: Denies depression, anxiety or mood swings.    PHYSICAL EXAM:  APPEARANCE: Well nourished, well developed, in no acute distress.  AFFECT: WNL, alert and oriented x 3  SKIN: No acne or hirsutism  CHEST: Good respiratory effect      ICD-10-CM ICD-9-CM    1. Cyst of ovary, unspecified laterality  N83.209 620.2 US Pelvis Comp with Transvag NON-OB (xpd          Discussed ultrasound findings and pathophysiology of cysts. Discussed this is most likely a benign cyst. Discussed treatment options - repeat ultrasound vs. Laparoscopic ovarian cystectomy. Recommended repeat ultrasound as the cyst could possibly resolve. Patient agrees. Discussed the risk of torsion and precautions.    Will email with results of above.

## 2022-10-24 ENCOUNTER — OFFICE VISIT (OUTPATIENT)
Dept: OBSTETRICS AND GYNECOLOGY | Facility: CLINIC | Age: 32
End: 2022-10-24
Payer: COMMERCIAL

## 2022-10-24 VITALS — BODY MASS INDEX: 27.73 KG/M2 | DIASTOLIC BLOOD PRESSURE: 76 MMHG | SYSTOLIC BLOOD PRESSURE: 124 MMHG | WEIGHT: 177 LBS

## 2022-10-24 DIAGNOSIS — N94.6 DYSMENORRHEA: ICD-10-CM

## 2022-10-24 DIAGNOSIS — R10.2 PELVIC PAIN: Primary | ICD-10-CM

## 2022-10-24 PROCEDURE — 99213 OFFICE O/P EST LOW 20 MIN: CPT | Mod: S$GLB,,, | Performed by: NURSE PRACTITIONER

## 2022-10-24 PROCEDURE — 1160F PR REVIEW ALL MEDS BY PRESCRIBER/CLIN PHARMACIST DOCUMENTED: ICD-10-PCS | Mod: CPTII,S$GLB,, | Performed by: NURSE PRACTITIONER

## 2022-10-24 PROCEDURE — 1160F RVW MEDS BY RX/DR IN RCRD: CPT | Mod: CPTII,S$GLB,, | Performed by: NURSE PRACTITIONER

## 2022-10-24 PROCEDURE — 99999 PR PBB SHADOW E&M-EST. PATIENT-LVL III: ICD-10-PCS | Mod: PBBFAC,,, | Performed by: NURSE PRACTITIONER

## 2022-10-24 PROCEDURE — 99213 PR OFFICE/OUTPT VISIT, EST, LEVL III, 20-29 MIN: ICD-10-PCS | Mod: S$GLB,,, | Performed by: NURSE PRACTITIONER

## 2022-10-24 PROCEDURE — 99999 PR PBB SHADOW E&M-EST. PATIENT-LVL III: CPT | Mod: PBBFAC,,, | Performed by: NURSE PRACTITIONER

## 2022-10-24 PROCEDURE — 3074F SYST BP LT 130 MM HG: CPT | Mod: CPTII,S$GLB,, | Performed by: NURSE PRACTITIONER

## 2022-10-24 PROCEDURE — 3074F PR MOST RECENT SYSTOLIC BLOOD PRESSURE < 130 MM HG: ICD-10-PCS | Mod: CPTII,S$GLB,, | Performed by: NURSE PRACTITIONER

## 2022-10-24 PROCEDURE — 3078F PR MOST RECENT DIASTOLIC BLOOD PRESSURE < 80 MM HG: ICD-10-PCS | Mod: CPTII,S$GLB,, | Performed by: NURSE PRACTITIONER

## 2022-10-24 PROCEDURE — 1159F PR MEDICATION LIST DOCUMENTED IN MEDICAL RECORD: ICD-10-PCS | Mod: CPTII,S$GLB,, | Performed by: NURSE PRACTITIONER

## 2022-10-24 PROCEDURE — 1159F MED LIST DOCD IN RCRD: CPT | Mod: CPTII,S$GLB,, | Performed by: NURSE PRACTITIONER

## 2022-10-24 PROCEDURE — 3078F DIAST BP <80 MM HG: CPT | Mod: CPTII,S$GLB,, | Performed by: NURSE PRACTITIONER

## 2022-10-24 RX ORDER — IBUPROFEN 800 MG/1
800 TABLET ORAL 3 TIMES DAILY
Qty: 30 TABLET | Refills: 1 | Status: SHIPPED | OUTPATIENT
Start: 2022-10-24

## 2022-10-24 NOTE — PROGRESS NOTES
"CC: Pelvic pain    HPI: Pt is a 31 y.o.  female  who presents with intermittent pelvic pain. States pain started earlier this month when cycle started, lasted for 2 days then resolved. States she experienced similar pain a few days ago then resolved. There are no triggers to cause pain, she reports pain "throughout pelvic area". She has a history of oligomenorrhea, evaluated 2022 pelvic ultrasound 10/6/2022 showed   FINDINGS:  The uterus is normal in appearance.  It measures 9.4 cm in craniocaudal dimension by 4.2 cm in AP dimension by 4.9 cm in mediolateral dimension.  The endometrial stripe thickness measures 11 mm.     The left ovary is enlarged.  It measures 7.5 cm by 6.1 cm x 5.8 cm.  It has arterial flow with a peak systolic velocity of 14 centimeters/second.  There has been interval development of a 5.5 cm cyst in the left ovary that has a thin septation associated with it.  The right ovary is normal in appearance.  It measures 4.5 cm x 1.7 cm x 1.7 cm.  It has arterial flow with a peak systolic velocity of 11 centimeters/second.     The urinary bladder is normal in appearance.  There is no free fluid visualized within the pelvis.     Impression:     1. The left ovary is enlarged. It measures 7.5 cm by 6.1 cm x 5.8 cm. There has been interval development of a 5.5 cm cyst in the left ovary that has a thin septation associated with it.  An enlarged ovary has a higher risk factor for a torsion.    This months was first cycle after 3 months, she has seen Dr. Mclean, follow up ultrasound has been scheduled.     She denies abnormal vaginal discharge, dysuria, nausea, vomiting, constipation or diarrhea.     She has tried midol and tylenol with minimal relief.      History reviewed. No pertinent past medical history.    Past Surgical History:   Procedure Laterality Date    DIAGNOSTIC LAPAROSCOPY      KNEE ARTHROPLASTY Right        OB History    Para Term  AB Living   0 0 0 0 0 0   SAB IAB " Ectopic Multiple Live Births   0 0 0 0 0       Family History   Problem Relation Age of Onset    Hypertension Mother     Blindness Neg Hx     Amblyopia Neg Hx     Cancer Neg Hx     Cataracts Neg Hx     Diabetes Neg Hx     Glaucoma Neg Hx     Macular degeneration Neg Hx     Retinal detachment Neg Hx     Strabismus Neg Hx     Stroke Neg Hx     Thyroid disease Neg Hx     Breast cancer Neg Hx     Colon cancer Neg Hx     Ovarian cancer Neg Hx        Social History     Socioeconomic History    Marital status: Single   Tobacco Use    Smoking status: Never    Smokeless tobacco: Never   Substance and Sexual Activity    Alcohol use: No    Drug use: No    Sexual activity: Yes     Partners: Male     Birth control/protection: None       /76   Wt 80.3 kg (177 lb 0.5 oz)   LMP 10/15/2022 (Approximate)   BMI 27.73 kg/m²     ROS:  GENERAL: No fever, chills, fatigability or weight loss.  VULVAR: No pain, no lesions and no itching.  VAGINAL: No relaxation, no itching, no discharge, no abnormal bleeding and no lesions.  ABDOMEN: + abdominal pain. Denies nausea. Denies vomiting. No diarrhea. No constipation  BREAST: Denies pain. No lumps. No discharge.  URINARY: No incontinence, no nocturia, no frequency and no dysuria.  CARDIOVASCULAR: No chest pain. No shortness of breath. No leg cramps.  NEUROLOGICAL: No headaches. No vision changes.    PE:   APPEARANCE: Well nourished, well developed, in no acute distress.  AFFECT: Alert and oriented x 3  SKIN: Warm, dry, & intact. No acne or hirsutism.  NECK: Neck symmetric  NODES: No inguinal or femoral lymph node enlargement  CHEST:  Easy, even breaths.  ABDOMEN: Soft.  Nontender, nondistended.  PELVIC: Normal external genitalia without lesions.  Normal hair distribution.  Adequate perineal body, normal urethral meatus. Vagina moist and well rugated without lesions or discharge.  Cervix pink, without lesions, discharge or tenderness.  No significant cystocele or rectocele. Bimanual exam  shows uterus to be normal size, regular, mobile and nontender.  Adnexa without masses or tenderness.   Anus Perineum:No lesions, no relaxation, no external hemorrhoids.  EXTREMITIES: No edema.    ASSESSMENT AND PLAN  1. Pelvic pain  ibuprofen (ADVIL,MOTRIN) 800 MG tablet        Discussed:  -Pelvic pain: related to cyst vs menstrual cycle, offered combo OCP and NSAIDs she would like to try Ibuprofen. Advised not to take with other NSAIDS  -Keep f/u u/s as scheduled  -ED pain precautions given    20+ min total time spent with patient: this includes face to face time and non-face to face time preparing to see the patient (eg, review of tests), obtaining and/or reviewing separately obtained history, documenting clinical information in the electronic or other health record, independently interpreting results and communicating results to the patient/family/caregiver, or care coordinator.

## 2022-11-02 ENCOUNTER — HOSPITAL ENCOUNTER (OUTPATIENT)
Dept: RADIOLOGY | Facility: HOSPITAL | Age: 32
Discharge: HOME | End: 2022-11-02
Attending: OBSTETRICS & GYNECOLOGY
Payer: COMMERCIAL

## 2022-11-02 DIAGNOSIS — N83.209 CYST OF OVARY, UNSPECIFIED LATERALITY: ICD-10-CM

## 2022-11-02 PROCEDURE — 76856 US EXAM PELVIC COMPLETE: CPT | Mod: 26,,, | Performed by: RADIOLOGY

## 2022-11-02 PROCEDURE — 76830 TRANSVAGINAL US NON-OB: CPT | Mod: 26,,, | Performed by: RADIOLOGY

## 2022-11-02 PROCEDURE — 76830 US PELVIS COMP WITH TRANSVAG NON-OB (XPD): ICD-10-PCS | Mod: 26,,, | Performed by: RADIOLOGY

## 2022-11-02 PROCEDURE — 76830 TRANSVAGINAL US NON-OB: CPT | Mod: TC

## 2022-11-02 PROCEDURE — 76856 US PELVIS COMP WITH TRANSVAG NON-OB (XPD): ICD-10-PCS | Mod: 26,,, | Performed by: RADIOLOGY

## 2022-11-21 ENCOUNTER — OFFICE VISIT (OUTPATIENT)
Dept: URGENT CARE | Facility: CLINIC | Age: 32
End: 2022-11-21
Payer: COMMERCIAL

## 2022-11-21 VITALS
HEART RATE: 66 BPM | WEIGHT: 168 LBS | OXYGEN SATURATION: 98 % | DIASTOLIC BLOOD PRESSURE: 79 MMHG | RESPIRATION RATE: 20 BRPM | SYSTOLIC BLOOD PRESSURE: 134 MMHG | TEMPERATURE: 98 F | BODY MASS INDEX: 26.37 KG/M2 | HEIGHT: 67 IN

## 2022-11-21 DIAGNOSIS — B34.9 ACUTE VIRAL SYNDROME: Primary | ICD-10-CM

## 2022-11-21 DIAGNOSIS — R09.81 CONGESTION OF PARANASAL SINUS: ICD-10-CM

## 2022-11-21 DIAGNOSIS — J02.9 SORE THROAT: ICD-10-CM

## 2022-11-21 LAB
CTP QC/QA: YES
CTP QC/QA: YES
MOLECULAR STREP A: NEGATIVE
POC MOLECULAR INFLUENZA A AGN: NEGATIVE
POC MOLECULAR INFLUENZA B AGN: NEGATIVE

## 2022-11-21 PROCEDURE — 87502 INFLUENZA DNA AMP PROBE: CPT | Mod: QW,S$GLB,, | Performed by: NURSE PRACTITIONER

## 2022-11-21 PROCEDURE — 1160F PR REVIEW ALL MEDS BY PRESCRIBER/CLIN PHARMACIST DOCUMENTED: ICD-10-PCS | Mod: CPTII,S$GLB,, | Performed by: NURSE PRACTITIONER

## 2022-11-21 PROCEDURE — 1159F PR MEDICATION LIST DOCUMENTED IN MEDICAL RECORD: ICD-10-PCS | Mod: CPTII,S$GLB,, | Performed by: NURSE PRACTITIONER

## 2022-11-21 PROCEDURE — 3008F BODY MASS INDEX DOCD: CPT | Mod: CPTII,S$GLB,, | Performed by: NURSE PRACTITIONER

## 2022-11-21 PROCEDURE — 87651 POCT STREP A MOLECULAR: ICD-10-PCS | Mod: QW,S$GLB,, | Performed by: NURSE PRACTITIONER

## 2022-11-21 PROCEDURE — 1160F RVW MEDS BY RX/DR IN RCRD: CPT | Mod: CPTII,S$GLB,, | Performed by: NURSE PRACTITIONER

## 2022-11-21 PROCEDURE — 1159F MED LIST DOCD IN RCRD: CPT | Mod: CPTII,S$GLB,, | Performed by: NURSE PRACTITIONER

## 2022-11-21 PROCEDURE — 99213 PR OFFICE/OUTPT VISIT, EST, LEVL III, 20-29 MIN: ICD-10-PCS | Mod: S$GLB,,, | Performed by: NURSE PRACTITIONER

## 2022-11-21 PROCEDURE — 3078F PR MOST RECENT DIASTOLIC BLOOD PRESSURE < 80 MM HG: ICD-10-PCS | Mod: CPTII,S$GLB,, | Performed by: NURSE PRACTITIONER

## 2022-11-21 PROCEDURE — 99213 OFFICE O/P EST LOW 20 MIN: CPT | Mod: S$GLB,,, | Performed by: NURSE PRACTITIONER

## 2022-11-21 PROCEDURE — 87651 STREP A DNA AMP PROBE: CPT | Mod: QW,S$GLB,, | Performed by: NURSE PRACTITIONER

## 2022-11-21 PROCEDURE — 3075F PR MOST RECENT SYSTOLIC BLOOD PRESS GE 130-139MM HG: ICD-10-PCS | Mod: CPTII,S$GLB,, | Performed by: NURSE PRACTITIONER

## 2022-11-21 PROCEDURE — 87502 POCT INFLUENZA A/B MOLECULAR: ICD-10-PCS | Mod: QW,S$GLB,, | Performed by: NURSE PRACTITIONER

## 2022-11-21 PROCEDURE — 3078F DIAST BP <80 MM HG: CPT | Mod: CPTII,S$GLB,, | Performed by: NURSE PRACTITIONER

## 2022-11-21 PROCEDURE — 3075F SYST BP GE 130 - 139MM HG: CPT | Mod: CPTII,S$GLB,, | Performed by: NURSE PRACTITIONER

## 2022-11-21 PROCEDURE — 3008F PR BODY MASS INDEX (BMI) DOCUMENTED: ICD-10-PCS | Mod: CPTII,S$GLB,, | Performed by: NURSE PRACTITIONER

## 2022-11-21 RX ORDER — AZELASTINE 1 MG/ML
1 SPRAY, METERED NASAL 2 TIMES DAILY PRN
Qty: 30 ML | Refills: 0 | Status: SHIPPED | OUTPATIENT
Start: 2022-11-21 | End: 2023-04-13

## 2022-11-21 NOTE — PROGRESS NOTES
"Subjective:       Patient ID: Unique Cody is a 31 y.o. female.    Vitals:  height is 5' 7" (1.702 m) and weight is 76.2 kg (168 lb). Her temperature is 98.4 °F (36.9 °C). Her blood pressure is 134/79 and her pulse is 66. Her respiration is 20 and oxygen saturation is 98%.     Chief Complaint: Sore Throat (Cough, Fatigue)    31 yr Female presents today with sore throat, cough, fatigue, vomiting,   fever 101 .  Treatments at home included Theraflu, Nyquil with mild relief. Not Vaccinated for COVID. COVID positive 2021. Not Vaccinated for FLU.  Patient reports her symptoms are not worsening still the same, denies body aches or chills, denies wheezing or shortness of breath, denies nausea, diarrhea or abdominal pain, denies chest pain or dizziness positional lightheadedness, denies  trouble swallowing, denies loss of taste or smell, or any other symptoms        Sore Throat   This is a new problem. The current episode started in the past 7 days (11/14/2022). The problem has been unchanged. The maximum temperature recorded prior to her arrival was 101 - 101.9 F. The fever has been present for 1 to 2 days. The pain is at a severity of 7/10 (throat). The pain is mild. Associated symptoms include congestion, coughing, headaches, swollen glands, trouble swallowing and vomiting. Pertinent negatives include no diarrhea. Treatments tried: Theraflu, Nyquil. The treatment provided mild relief.     HENT:  Positive for congestion, sore throat and trouble swallowing.    Respiratory:  Positive for cough.    Gastrointestinal:  Positive for vomiting. Negative for diarrhea.   Neurological:  Positive for headaches.     Objective:      Physical Exam   Constitutional: She is oriented to person, place, and time. She appears well-developed. She is cooperative.  Non-toxic appearance. She does not appear ill. No distress.   HENT:   Head: Normocephalic and atraumatic.   Ears:   Right Ear: Hearing, tympanic membrane, external ear and ear canal " normal.   Left Ear: Hearing, tympanic membrane, external ear and ear canal normal.   Nose: Nose normal. No mucosal edema, rhinorrhea or nasal deformity. No epistaxis. Right sinus exhibits no maxillary sinus tenderness and no frontal sinus tenderness. Left sinus exhibits no maxillary sinus tenderness and no frontal sinus tenderness.   Mouth/Throat: Uvula is midline, oropharynx is clear and moist and mucous membranes are normal. No trismus in the jaw. Normal dentition. No uvula swelling. No oropharyngeal exudate, posterior oropharyngeal edema, posterior oropharyngeal erythema, tonsillar abscesses or cobblestoning.   Eyes: Conjunctivae and lids are normal. No scleral icterus.   Neck: Trachea normal and phonation normal. Neck supple. No edema present. No erythema present. No neck rigidity present.   Cardiovascular: Normal rate, regular rhythm, normal heart sounds and normal pulses.   Pulmonary/Chest: Effort normal and breath sounds normal. No respiratory distress. She has no decreased breath sounds. She has no rhonchi.   Abdominal: Normal appearance.   Musculoskeletal: Normal range of motion.         General: No deformity. Normal range of motion.   Lymphadenopathy:     She has no cervical adenopathy.   Neurological: She is alert and oriented to person, place, and time. She exhibits normal muscle tone. Coordination normal.   Skin: Skin is warm, dry, intact, not diaphoretic and not pale.   Psychiatric: Her speech is normal and behavior is normal. Judgment and thought content normal.   Nursing note and vitals reviewed.      Results for orders placed or performed in visit on 11/21/22   POCT Influenza A/B MOLECULAR   Result Value Ref Range    POC Molecular Influenza A Ag Negative Negative, Not Reported    POC Molecular Influenza B Ag Negative Negative, Not Reported     Acceptable Yes    POCT Strep A, Molecular   Result Value Ref Range    Molecular Strep A, POC Negative Negative     Acceptable  Yes          Patient in no acute distress.  Vitals reassuring.  Discussed results/diagnosis/plan in depth with patient in clinic. Strict precautions given to patient to monitor for worsening signs and symptoms. Advised to follow up with primary.All questions answered. Strict ER precautions given. If your symptoms worsens or fail to improve you should go to the Emergency Room. Discharge and follow-up instructions given verbally/printed. Discharge and follow-up instructions discussed with the patient who expressed understanding and willingness to comply with my recommendations.Patient voiced understanding and in agreement with current treatment plan.     Please be advised this text was dictated with [employer] software and may contain errors due to translation.    Assessment:       1. Acute viral syndrome    2. Sore throat    3. Congestion of paranasal sinus          Plan:         Acute viral syndrome  -     POCT Influenza A/B MOLECULAR  -     POCT Strep A, Molecular    Sore throat  -     (Magic mouthwash) 1:1:1 diphenhydrAMINE(Benadryl) 12.5mg/5ml liq, aluminum & magnesium hydroxide-simethicone (Maalox), LIDOcaine viscous 2%; Swish and spit 10 mLs every 4 (four) hours as needed (sore throat). for sore throat  Dispense: 90 mL; Refill: 0    Congestion of paranasal sinus  -     azelastine (ASTELIN) 137 mcg (0.1 %) nasal spray; 1 spray (137 mcg total) by Nasal route 2 (two) times daily as needed for Rhinitis.  Dispense: 30 mL; Refill: 0         Medical Decision Making:   Clinical Tests:   Lab Tests: Reviewed  Urgent Care Management:  Patient in no acute distress.  Vitals reassuring.  On exam, patient is nontoxic appearing and afebrile.  Lungs CTA.  Negative flu test and strep test results discussed with patient detail.  No oropharyngeal erythema, edema or exudate noted.  No lymphadenopathy noted.  Proper hydration advised.  Medication prescribed and over-the-counter medication discussed with patient detail.  Reiterated the  importance of further evaluation if no improvement in symptoms and follow-up with primary.Patient voiced understanding and in agreement with current treatment plan.

## 2022-12-08 ENCOUNTER — PATIENT MESSAGE (OUTPATIENT)
Dept: OBSTETRICS AND GYNECOLOGY | Facility: CLINIC | Age: 32
End: 2022-12-08
Payer: COMMERCIAL

## 2023-02-27 ENCOUNTER — HOSPITAL ENCOUNTER (EMERGENCY)
Facility: HOSPITAL | Age: 33
Discharge: HOME | End: 2023-02-27
Attending: EMERGENCY MEDICINE
Payer: COMMERCIAL

## 2023-02-27 VITALS
TEMPERATURE: 99 F | SYSTOLIC BLOOD PRESSURE: 141 MMHG | RESPIRATION RATE: 18 BRPM | OXYGEN SATURATION: 100 % | BODY MASS INDEX: 26.68 KG/M2 | WEIGHT: 170 LBS | HEART RATE: 80 BPM | DIASTOLIC BLOOD PRESSURE: 75 MMHG | HEIGHT: 67 IN

## 2023-02-27 DIAGNOSIS — T14.8XXA PUNCTURE WOUND: Primary | ICD-10-CM

## 2023-02-27 PROCEDURE — 99284 EMERGENCY DEPT VISIT MOD MDM: CPT | Mod: ER

## 2023-02-27 PROCEDURE — 63600175 PHARM REV CODE 636 W HCPCS: Mod: ER | Performed by: PHYSICIAN ASSISTANT

## 2023-02-27 PROCEDURE — 90715 TDAP VACCINE 7 YRS/> IM: CPT | Mod: ER | Performed by: PHYSICIAN ASSISTANT

## 2023-02-27 PROCEDURE — 90471 IMMUNIZATION ADMIN: CPT | Mod: ER | Performed by: PHYSICIAN ASSISTANT

## 2023-02-27 RX ADMIN — TETANUS TOXOID, REDUCED DIPHTHERIA TOXOID AND ACELLULAR PERTUSSIS VACCINE, ADSORBED 0.5 ML: 5; 2.5; 8; 8; 2.5 SUSPENSION INTRAMUSCULAR at 09:02

## 2023-02-28 NOTE — ED PROVIDER NOTES
Encounter Date: 2/27/2023       History     Chief Complaint   Patient presents with    Puncture Wound     Reports stepped on ward nail just PTA. States nail went through shoes. Needs Tetanus shot     HPI: Unique Cody, a 32 y.o. female  has no past medical history on file.     She presents to the ED for evaluation after stepping on a ward nail PTA though a shoe.  Prior to arrival.  Unsure of tetanus.  Patient states she cleaned area thoroughly and rubbed with alcohol prior to arrival.        The history is provided by the patient. No  was used.   Review of patient's allergies indicates:  No Known Allergies  History reviewed. No pertinent past medical history.  Past Surgical History:   Procedure Laterality Date    DIAGNOSTIC LAPAROSCOPY      KNEE ARTHROPLASTY Right      Family History   Problem Relation Age of Onset    Hypertension Mother     Blindness Neg Hx     Amblyopia Neg Hx     Cancer Neg Hx     Cataracts Neg Hx     Diabetes Neg Hx     Glaucoma Neg Hx     Macular degeneration Neg Hx     Retinal detachment Neg Hx     Strabismus Neg Hx     Stroke Neg Hx     Thyroid disease Neg Hx     Breast cancer Neg Hx     Colon cancer Neg Hx     Ovarian cancer Neg Hx      Social History     Tobacco Use    Smoking status: Never    Smokeless tobacco: Never   Substance Use Topics    Alcohol use: No    Drug use: No     Review of Systems   Constitutional:  Negative for fever.   Skin:  Positive for wound. Negative for color change.   Allergic/Immunologic: Negative for immunocompromised state.   Neurological:  Negative for weakness and numbness.   Psychiatric/Behavioral:  Negative for agitation.    All other systems reviewed and are negative.    Physical Exam     Initial Vitals [02/27/23 2119]   BP Pulse Resp Temp SpO2   (!) 141/75 80 18 98.7 °F (37.1 °C) 100 %      MAP       --         Physical Exam    Nursing note and vitals reviewed.  Constitutional: She appears well-developed and well-nourished. She is not  diaphoretic. No distress.   HENT:   Head: Normocephalic and atraumatic.   Right Ear: External ear normal.   Left Ear: External ear normal.   Nose: Nose normal.   Eyes: Conjunctivae and EOM are normal.   Neck:   Normal range of motion.  Cardiovascular:  Normal rate and regular rhythm.           Pulmonary/Chest: No respiratory distress.   Musculoskeletal:         General: Normal range of motion.      Cervical back: Normal range of motion.        Feet:      Neurological: She is alert and oriented to person, place, and time.   Skin: Skin is warm. Capillary refill takes less than 2 seconds. No rash noted.   Psychiatric: She has a normal mood and affect. Thought content normal.       ED Course   Procedures  Labs Reviewed - No data to display       Imaging Results    None          Medications   Tdap (BOOSTRIX) vaccine injection 0.5 mL (0.5 mLs Intramuscular Given 2/27/23 2133)     Medical Decision Making:   Initial Assessment:   Puncture wound to foot   Differential Diagnosis:   Puncture wound, retained foreign body, infection  ED Management:  Patient presents to the ER for evaluation of immediate evaluation after puncture wound.  Area was thoroughly irrigated by patient and by nursing staff.  Tetanus was updated.  Given normal immune system to patient, no antibiotics needed currently but she was instructed to monitor wound for any concerning signs of infection and to return immediately should such symptoms arise.  Patient verbalized understanding and agreement plan.                        Clinical Impression:   Final diagnoses:  [T14.8XXA] Puncture wound (Primary)        ED Disposition Condition    Discharge Stable          ED Prescriptions    None       Follow-up Information    None          Octavia Meyers PA-C  02/28/23 1563

## 2023-04-13 ENCOUNTER — OFFICE VISIT (OUTPATIENT)
Dept: INTERNAL MEDICINE | Facility: CLINIC | Age: 33
End: 2023-04-13
Payer: COMMERCIAL

## 2023-04-13 VITALS
BODY MASS INDEX: 26.99 KG/M2 | SYSTOLIC BLOOD PRESSURE: 128 MMHG | OXYGEN SATURATION: 99 % | WEIGHT: 171.94 LBS | HEIGHT: 67 IN | DIASTOLIC BLOOD PRESSURE: 86 MMHG | HEART RATE: 88 BPM

## 2023-04-13 DIAGNOSIS — Z00.00 ANNUAL PHYSICAL EXAM: Primary | ICD-10-CM

## 2023-04-13 PROCEDURE — 1160F PR REVIEW ALL MEDS BY PRESCRIBER/CLIN PHARMACIST DOCUMENTED: ICD-10-PCS | Mod: CPTII,S$GLB,, | Performed by: PHYSICIAN ASSISTANT

## 2023-04-13 PROCEDURE — 3074F SYST BP LT 130 MM HG: CPT | Mod: CPTII,S$GLB,, | Performed by: PHYSICIAN ASSISTANT

## 2023-04-13 PROCEDURE — 1159F PR MEDICATION LIST DOCUMENTED IN MEDICAL RECORD: ICD-10-PCS | Mod: CPTII,S$GLB,, | Performed by: PHYSICIAN ASSISTANT

## 2023-04-13 PROCEDURE — 3008F PR BODY MASS INDEX (BMI) DOCUMENTED: ICD-10-PCS | Mod: CPTII,S$GLB,, | Performed by: PHYSICIAN ASSISTANT

## 2023-04-13 PROCEDURE — 3079F PR MOST RECENT DIASTOLIC BLOOD PRESSURE 80-89 MM HG: ICD-10-PCS | Mod: CPTII,S$GLB,, | Performed by: PHYSICIAN ASSISTANT

## 2023-04-13 PROCEDURE — 1159F MED LIST DOCD IN RCRD: CPT | Mod: CPTII,S$GLB,, | Performed by: PHYSICIAN ASSISTANT

## 2023-04-13 PROCEDURE — 99385 PR PREVENTIVE VISIT,NEW,18-39: ICD-10-PCS | Mod: S$GLB,,, | Performed by: PHYSICIAN ASSISTANT

## 2023-04-13 PROCEDURE — 99999 PR PBB SHADOW E&M-EST. PATIENT-LVL III: CPT | Mod: PBBFAC,,, | Performed by: PHYSICIAN ASSISTANT

## 2023-04-13 PROCEDURE — 99385 PREV VISIT NEW AGE 18-39: CPT | Mod: S$GLB,,, | Performed by: PHYSICIAN ASSISTANT

## 2023-04-13 PROCEDURE — 3008F BODY MASS INDEX DOCD: CPT | Mod: CPTII,S$GLB,, | Performed by: PHYSICIAN ASSISTANT

## 2023-04-13 PROCEDURE — 1160F RVW MEDS BY RX/DR IN RCRD: CPT | Mod: CPTII,S$GLB,, | Performed by: PHYSICIAN ASSISTANT

## 2023-04-13 PROCEDURE — 99999 PR PBB SHADOW E&M-EST. PATIENT-LVL III: ICD-10-PCS | Mod: PBBFAC,,, | Performed by: PHYSICIAN ASSISTANT

## 2023-04-13 PROCEDURE — 3079F DIAST BP 80-89 MM HG: CPT | Mod: CPTII,S$GLB,, | Performed by: PHYSICIAN ASSISTANT

## 2023-04-13 PROCEDURE — 3074F PR MOST RECENT SYSTOLIC BLOOD PRESSURE < 130 MM HG: ICD-10-PCS | Mod: CPTII,S$GLB,, | Performed by: PHYSICIAN ASSISTANT

## 2023-04-13 NOTE — PROGRESS NOTES
"Subjective     Patient ID: Unique Cody is a 32 y.o. female.    Chief Complaint: Annual Exam    HPI    Established pt of AZIKIWE K. LOMBARD, MD (new to me)    Here for annual exam.     Past Medical History:   Diagnosis Date    Kidney stone        Social History     Tobacco Use    Smoking status: Never    Smokeless tobacco: Never   Substance Use Topics    Alcohol use: No    Drug use: No     Review of patient's allergies indicates:  No Known Allergies      Current Outpatient Medications:     acetaminophen (TYLENOL) 650 MG TbSR, Take 1 tablet (650 mg total) by mouth every 8 (eight) hours., Disp: , Rfl: 0    cyclobenzaprine (FLEXERIL) 10 MG tablet, Take 10 mg by mouth 3 (three) times daily as needed., Disp: , Rfl:     ibuprofen (ADVIL,MOTRIN) 800 MG tablet, Take 1 tablet (800 mg total) by mouth 3 (three) times daily. As needed for pelvic pain, Disp: 30 tablet, Rfl: 1        Review of Systems   Constitutional:  Negative for chills, fever and unexpected weight change.   Respiratory:  Negative for cough, shortness of breath and wheezing.    Cardiovascular:  Negative for chest pain and leg swelling.   Gastrointestinal:  Negative for abdominal pain, nausea and vomiting.   Integumentary:  Negative for rash.   Neurological:  Positive for light-headedness (occ when bending/changing positions). Negative for weakness and headaches.        Objective  /86 (BP Location: Left arm, Patient Position: Sitting, BP Method: Medium (Manual))   Pulse 88   Ht 5' 7" (1.702 m)   Wt 78 kg (171 lb 15.3 oz)   SpO2 99%   BMI 26.93 kg/m²       Physical Exam  Vitals reviewed.   Constitutional:       General: She is not in acute distress.     Appearance: She is well-developed.   HENT:      Head: Normocephalic and atraumatic.      Right Ear: Tympanic membrane, ear canal and external ear normal.      Left Ear: Tympanic membrane, ear canal and external ear normal.   Eyes:      Pupils: Pupils are equal, round, and reactive to light. "   Cardiovascular:      Rate and Rhythm: Normal rate and regular rhythm.      Heart sounds: No murmur heard.  Pulmonary:      Effort: Pulmonary effort is normal.      Breath sounds: Normal breath sounds. No wheezing or rales.   Abdominal:      General: Bowel sounds are normal.      Palpations: Abdomen is soft.      Tenderness: There is no abdominal tenderness.   Musculoskeletal:      Right lower leg: No edema.      Left lower leg: No edema.   Skin:     General: Skin is warm and dry.      Capillary Refill: Capillary refill takes less than 2 seconds.      Findings: No rash.   Neurological:      Mental Status: She is alert and oriented to person, place, and time.   Psychiatric:         Mood and Affect: Mood normal.          Assessment and Plan     Problem List Items Addressed This Visit    None  Visit Diagnoses       Annual physical exam    -  Primary        Unique was seen today for annual exam.    Diagnoses and all orders for this visit:    Annual physical exam  HM reviewed and updated  Discussed staying well hydrated, compression stocking  Follow up lab for any abnl  -     CBC Auto Differential; Future  -     Comprehensive Metabolic Panel; Future  -     TSH; Future  -     Lipid Panel; Future  -     Hemoglobin A1C; Future  -     Vitamin D; Future      RTC in 1 year for next annual with MD as PCP or sooner if needed.     Kimberly Stearns PA-C

## 2023-04-13 NOTE — PATIENT INSTRUCTIONS
"[facility] [phone]    [facility]  -Lisha Jack M.D.  -Suzy Hinds [facility] "[facility]" DENYS Olguin.  -Shaista Melara M.D.  -Corinne Beth M.D.    [facility] [address], [address], [place] [address]):  - Michael Maciel M.D.  - Wei Tiwari M.D.     [place] ([address] [place] [address])  - Quintin Chavez M.D.  - Phi Casanova M.D.  - Nena Jackson M.D.  - Mitul John M.D.     [facility] ([address] [place] [address]):  -DIMITRIOS Zambrano  -Michael Garcia M.D     [place] ([address], [address], [place] [address]):  - Je Stewart M.D.  - Panfilo Almazan M.D.    [facility] ([address] [place] [address]):  - Terry Bro MD    [facility] [address]  Pancho Walker MD    [place] ([address])  -Rudy Ruiz M.D.  - Cristal Dee M.D.    [facility], [address]"

## 2023-04-14 ENCOUNTER — PATIENT MESSAGE (OUTPATIENT)
Dept: INTERNAL MEDICINE | Facility: CLINIC | Age: 33
End: 2023-04-14
Payer: COMMERCIAL

## 2023-04-14 ENCOUNTER — LAB VISIT (OUTPATIENT)
Dept: LAB | Facility: HOSPITAL | Age: 33
End: 2023-04-14
Attending: PHYSICIAN ASSISTANT
Payer: COMMERCIAL

## 2023-04-14 ENCOUNTER — TELEPHONE (OUTPATIENT)
Dept: INTERNAL MEDICINE | Facility: CLINIC | Age: 33
End: 2023-04-14
Payer: COMMERCIAL

## 2023-04-14 DIAGNOSIS — Z00.00 ANNUAL PHYSICAL EXAM: ICD-10-CM

## 2023-04-14 LAB
ALBUMIN SERPL BCP-MCNC: 4.3 G/DL (ref 3.5–5.2)
ALP SERPL-CCNC: 69 U/L (ref 38–126)
ALT SERPL W/O P-5'-P-CCNC: 16 U/L (ref 10–44)
ANION GAP SERPL CALC-SCNC: 6 MMOL/L (ref 8–16)
AST SERPL-CCNC: 24 U/L (ref 15–46)
BASOPHILS # BLD AUTO: 0.03 K/UL (ref 0–0.2)
BASOPHILS NFR BLD: 0.6 % (ref 0–1.9)
BILIRUB SERPL-MCNC: 0.3 MG/DL (ref 0.1–1)
CALCIUM SERPL-MCNC: 8.7 MG/DL (ref 8.7–10.5)
CHLORIDE SERPL-SCNC: 106 MMOL/L (ref 95–110)
CHOLEST SERPL-MCNC: 224 MG/DL (ref 120–199)
CHOLEST/HDLC SERPL: 3.1 {RATIO} (ref 2–5)
CO2 SERPL-SCNC: 25 MMOL/L (ref 23–29)
CREAT SERPL-MCNC: 1.13 MG/DL (ref 0.5–1.4)
DIFFERENTIAL METHOD: ABNORMAL
EOSINOPHIL # BLD AUTO: 0 K/UL (ref 0–0.5)
EOSINOPHIL NFR BLD: 0.8 % (ref 0–8)
ERYTHROCYTE [DISTWIDTH] IN BLOOD BY AUTOMATED COUNT: 13.3 % (ref 11.5–14.5)
EST. GFR  (NO RACE VARIABLE): >60 ML/MIN/1.73 M^2
ESTIMATED AVG GLUCOSE: 108 MG/DL (ref 68–131)
GLUCOSE SERPL-MCNC: 91 MG/DL (ref 70–110)
HBA1C MFR BLD: 5.4 % (ref 4–5.6)
HCT VFR BLD AUTO: 40.1 % (ref 37–48.5)
HDLC SERPL-MCNC: 72 MG/DL (ref 40–75)
HDLC SERPL: 32.1 % (ref 20–50)
HGB BLD-MCNC: 12.6 G/DL (ref 12–16)
IMM GRANULOCYTES # BLD AUTO: 0.01 K/UL (ref 0–0.04)
IMM GRANULOCYTES NFR BLD AUTO: 0.2 % (ref 0–0.5)
LDLC SERPL CALC-MCNC: 139.6 MG/DL (ref 63–159)
LYMPHOCYTES # BLD AUTO: 1.7 K/UL (ref 1–4.8)
LYMPHOCYTES NFR BLD: 32 % (ref 18–48)
MCH RBC QN AUTO: 26.9 PG (ref 27–31)
MCHC RBC AUTO-ENTMCNC: 31.4 G/DL (ref 32–36)
MCV RBC AUTO: 86 FL (ref 82–98)
MONOCYTES # BLD AUTO: 0.4 K/UL (ref 0.3–1)
MONOCYTES NFR BLD: 7.5 % (ref 4–15)
NEUTROPHILS # BLD AUTO: 3.1 K/UL (ref 1.8–7.7)
NEUTROPHILS NFR BLD: 58.9 % (ref 38–73)
NONHDLC SERPL-MCNC: 152 MG/DL
NRBC BLD-RTO: 0 /100 WBC
PLATELET # BLD AUTO: 327 K/UL (ref 150–450)
PMV BLD AUTO: 10.3 FL (ref 9.2–12.9)
POTASSIUM SERPL-SCNC: 4.5 MMOL/L (ref 3.5–5.1)
PROT SERPL-MCNC: 7.5 G/DL (ref 6–8.4)
RBC # BLD AUTO: 4.68 M/UL (ref 4–5.4)
SODIUM SERPL-SCNC: 137 MMOL/L (ref 136–145)
TRIGL SERPL-MCNC: 62 MG/DL (ref 30–150)
TSH SERPL DL<=0.005 MIU/L-ACNC: 1.66 UIU/ML (ref 0.4–4)
UUN UR-MCNC: 19 MG/DL (ref 7–17)
WBC # BLD AUTO: 5.31 K/UL (ref 3.9–12.7)

## 2023-04-14 PROCEDURE — 36415 COLL VENOUS BLD VENIPUNCTURE: CPT | Mod: PO | Performed by: PHYSICIAN ASSISTANT

## 2023-04-14 PROCEDURE — 80053 COMPREHEN METABOLIC PANEL: CPT | Mod: PO | Performed by: PHYSICIAN ASSISTANT

## 2023-04-14 PROCEDURE — 85025 COMPLETE CBC W/AUTO DIFF WBC: CPT | Mod: PO | Performed by: PHYSICIAN ASSISTANT

## 2023-04-14 PROCEDURE — 83036 HEMOGLOBIN GLYCOSYLATED A1C: CPT | Performed by: PHYSICIAN ASSISTANT

## 2023-04-14 PROCEDURE — 84443 ASSAY THYROID STIM HORMONE: CPT | Mod: PO | Performed by: PHYSICIAN ASSISTANT

## 2023-04-14 PROCEDURE — 80061 LIPID PANEL: CPT | Performed by: PHYSICIAN ASSISTANT

## 2023-04-14 NOTE — TELEPHONE ENCOUNTER
----- Message from Mayte Garcia sent at 4/14/2023  1:01 PM CDT -----  Contact: Self/[phone]  Pt said that she is calling in regards to needing to get an explanation of her lab results. Please advise

## 2023-04-14 NOTE — TELEPHONE ENCOUNTER
Called pt; pt answered    Informed pt that, at this time, not all of her labs have been processed and that THONG macias will contact her when she has the chance to review them     Pt verbalized understanding

## 2023-05-08 ENCOUNTER — TELEPHONE (OUTPATIENT)
Dept: OBSTETRICS AND GYNECOLOGY | Facility: CLINIC | Age: 33
End: 2023-05-08
Payer: COMMERCIAL

## 2023-05-08 ENCOUNTER — OFFICE VISIT (OUTPATIENT)
Dept: OBSTETRICS AND GYNECOLOGY | Facility: CLINIC | Age: 33
End: 2023-05-08
Payer: COMMERCIAL

## 2023-05-08 VITALS
BODY MASS INDEX: 27.05 KG/M2 | DIASTOLIC BLOOD PRESSURE: 82 MMHG | SYSTOLIC BLOOD PRESSURE: 118 MMHG | WEIGHT: 172.75 LBS

## 2023-05-08 DIAGNOSIS — R32 URINARY INCONTINENCE, UNSPECIFIED TYPE: Primary | ICD-10-CM

## 2023-05-08 PROCEDURE — 99213 PR OFFICE/OUTPT VISIT, EST, LEVL III, 20-29 MIN: ICD-10-PCS | Mod: S$GLB,,, | Performed by: OBSTETRICS & GYNECOLOGY

## 2023-05-08 PROCEDURE — 1159F MED LIST DOCD IN RCRD: CPT | Mod: CPTII,S$GLB,, | Performed by: OBSTETRICS & GYNECOLOGY

## 2023-05-08 PROCEDURE — 99999 PR PBB SHADOW E&M-EST. PATIENT-LVL III: ICD-10-PCS | Mod: PBBFAC,,, | Performed by: OBSTETRICS & GYNECOLOGY

## 2023-05-08 PROCEDURE — 87086 URINE CULTURE/COLONY COUNT: CPT | Performed by: OBSTETRICS & GYNECOLOGY

## 2023-05-08 PROCEDURE — 3074F SYST BP LT 130 MM HG: CPT | Mod: CPTII,S$GLB,, | Performed by: OBSTETRICS & GYNECOLOGY

## 2023-05-08 PROCEDURE — 1160F RVW MEDS BY RX/DR IN RCRD: CPT | Mod: CPTII,S$GLB,, | Performed by: OBSTETRICS & GYNECOLOGY

## 2023-05-08 PROCEDURE — 1160F PR REVIEW ALL MEDS BY PRESCRIBER/CLIN PHARMACIST DOCUMENTED: ICD-10-PCS | Mod: CPTII,S$GLB,, | Performed by: OBSTETRICS & GYNECOLOGY

## 2023-05-08 PROCEDURE — 3074F PR MOST RECENT SYSTOLIC BLOOD PRESSURE < 130 MM HG: ICD-10-PCS | Mod: CPTII,S$GLB,, | Performed by: OBSTETRICS & GYNECOLOGY

## 2023-05-08 PROCEDURE — 3079F PR MOST RECENT DIASTOLIC BLOOD PRESSURE 80-89 MM HG: ICD-10-PCS | Mod: CPTII,S$GLB,, | Performed by: OBSTETRICS & GYNECOLOGY

## 2023-05-08 PROCEDURE — 3079F DIAST BP 80-89 MM HG: CPT | Mod: CPTII,S$GLB,, | Performed by: OBSTETRICS & GYNECOLOGY

## 2023-05-08 PROCEDURE — 3008F BODY MASS INDEX DOCD: CPT | Mod: CPTII,S$GLB,, | Performed by: OBSTETRICS & GYNECOLOGY

## 2023-05-08 PROCEDURE — 3044F PR MOST RECENT HEMOGLOBIN A1C LEVEL <7.0%: ICD-10-PCS | Mod: CPTII,S$GLB,, | Performed by: OBSTETRICS & GYNECOLOGY

## 2023-05-08 PROCEDURE — 3008F PR BODY MASS INDEX (BMI) DOCUMENTED: ICD-10-PCS | Mod: CPTII,S$GLB,, | Performed by: OBSTETRICS & GYNECOLOGY

## 2023-05-08 PROCEDURE — 1159F PR MEDICATION LIST DOCUMENTED IN MEDICAL RECORD: ICD-10-PCS | Mod: CPTII,S$GLB,, | Performed by: OBSTETRICS & GYNECOLOGY

## 2023-05-08 PROCEDURE — 99999 PR PBB SHADOW E&M-EST. PATIENT-LVL III: CPT | Mod: PBBFAC,,, | Performed by: OBSTETRICS & GYNECOLOGY

## 2023-05-08 PROCEDURE — 3044F HG A1C LEVEL LT 7.0%: CPT | Mod: CPTII,S$GLB,, | Performed by: OBSTETRICS & GYNECOLOGY

## 2023-05-08 PROCEDURE — 99213 OFFICE O/P EST LOW 20 MIN: CPT | Mod: S$GLB,,, | Performed by: OBSTETRICS & GYNECOLOGY

## 2023-05-10 LAB
BACTERIA UR CULT: NORMAL
BACTERIA UR CULT: NORMAL

## 2023-05-11 ENCOUNTER — TELEPHONE (OUTPATIENT)
Dept: OBSTETRICS AND GYNECOLOGY | Facility: CLINIC | Age: 33
End: 2023-05-11
Payer: COMMERCIAL

## 2023-05-11 DIAGNOSIS — R32 URINARY INCONTINENCE, UNSPECIFIED TYPE: Primary | ICD-10-CM

## 2023-05-11 NOTE — TELEPHONE ENCOUNTER
----- Message from Belkys Nguyễn MD sent at 5/11/2023  9:00 AM CDT -----  Patient needs to repeat CC Urine culture, orders placed for lab collect

## 2023-05-22 ENCOUNTER — LAB VISIT (OUTPATIENT)
Dept: LAB | Facility: OTHER | Age: 33
End: 2023-05-22
Attending: INTERNAL MEDICINE
Payer: COMMERCIAL

## 2023-05-22 ENCOUNTER — OFFICE VISIT (OUTPATIENT)
Dept: INTERNAL MEDICINE | Facility: CLINIC | Age: 33
End: 2023-05-22
Attending: INTERNAL MEDICINE
Payer: COMMERCIAL

## 2023-05-22 VITALS
OXYGEN SATURATION: 98 % | WEIGHT: 168.88 LBS | BODY MASS INDEX: 26.51 KG/M2 | DIASTOLIC BLOOD PRESSURE: 88 MMHG | HEART RATE: 95 BPM | HEIGHT: 67 IN | SYSTOLIC BLOOD PRESSURE: 134 MMHG

## 2023-05-22 DIAGNOSIS — K52.9 GASTROENTERITIS: ICD-10-CM

## 2023-05-22 DIAGNOSIS — Z11.3 SCREENING EXAMINATION FOR STD (SEXUALLY TRANSMITTED DISEASE): ICD-10-CM

## 2023-05-22 DIAGNOSIS — G44.209 ACUTE NON INTRACTABLE TENSION-TYPE HEADACHE: ICD-10-CM

## 2023-05-22 DIAGNOSIS — M54.2 NECK PAIN, BILATERAL: ICD-10-CM

## 2023-05-22 DIAGNOSIS — R05.9 COUGH, UNSPECIFIED TYPE: Primary | ICD-10-CM

## 2023-05-22 LAB
HBV SURFACE AG SERPL QL IA: NORMAL
HCV AB SERPL QL IA: NORMAL
HIV 1+2 AB+HIV1 P24 AG SERPL QL IA: NORMAL
RPR SER QL: NORMAL
SARS-COV-2 RNA RESP QL NAA+PROBE: NOT DETECTED

## 2023-05-22 PROCEDURE — 86592 SYPHILIS TEST NON-TREP QUAL: CPT | Performed by: INTERNAL MEDICINE

## 2023-05-22 PROCEDURE — 87389 HIV-1 AG W/HIV-1&-2 AB AG IA: CPT | Performed by: INTERNAL MEDICINE

## 2023-05-22 PROCEDURE — 87635 SARS-COV-2 COVID-19 AMP PRB: CPT | Performed by: INTERNAL MEDICINE

## 2023-05-22 PROCEDURE — 99214 PR OFFICE/OUTPT VISIT, EST, LEVL IV, 30-39 MIN: ICD-10-PCS | Mod: 25,S$GLB,, | Performed by: INTERNAL MEDICINE

## 2023-05-22 PROCEDURE — 3044F HG A1C LEVEL LT 7.0%: CPT | Mod: CPTII,S$GLB,, | Performed by: INTERNAL MEDICINE

## 2023-05-22 PROCEDURE — 96372 THER/PROPH/DIAG INJ SC/IM: CPT | Mod: S$GLB,,, | Performed by: INTERNAL MEDICINE

## 2023-05-22 PROCEDURE — 99999 PR PBB SHADOW E&M-EST. PATIENT-LVL III: CPT | Mod: PBBFAC,,, | Performed by: INTERNAL MEDICINE

## 2023-05-22 PROCEDURE — 87340 HEPATITIS B SURFACE AG IA: CPT | Performed by: INTERNAL MEDICINE

## 2023-05-22 PROCEDURE — 1160F RVW MEDS BY RX/DR IN RCRD: CPT | Mod: CPTII,S$GLB,, | Performed by: INTERNAL MEDICINE

## 2023-05-22 PROCEDURE — 3008F BODY MASS INDEX DOCD: CPT | Mod: CPTII,S$GLB,, | Performed by: INTERNAL MEDICINE

## 2023-05-22 PROCEDURE — 96372 PR INJECTION,THERAP/PROPH/DIAG2ST, IM OR SUBCUT: ICD-10-PCS | Mod: S$GLB,,, | Performed by: INTERNAL MEDICINE

## 2023-05-22 PROCEDURE — 3008F PR BODY MASS INDEX (BMI) DOCUMENTED: ICD-10-PCS | Mod: CPTII,S$GLB,, | Performed by: INTERNAL MEDICINE

## 2023-05-22 PROCEDURE — 3079F DIAST BP 80-89 MM HG: CPT | Mod: CPTII,S$GLB,, | Performed by: INTERNAL MEDICINE

## 2023-05-22 PROCEDURE — 3079F PR MOST RECENT DIASTOLIC BLOOD PRESSURE 80-89 MM HG: ICD-10-PCS | Mod: CPTII,S$GLB,, | Performed by: INTERNAL MEDICINE

## 2023-05-22 PROCEDURE — 99999 PR PBB SHADOW E&M-EST. PATIENT-LVL III: ICD-10-PCS | Mod: PBBFAC,,, | Performed by: INTERNAL MEDICINE

## 2023-05-22 PROCEDURE — 3075F SYST BP GE 130 - 139MM HG: CPT | Mod: CPTII,S$GLB,, | Performed by: INTERNAL MEDICINE

## 2023-05-22 PROCEDURE — 99214 OFFICE O/P EST MOD 30 MIN: CPT | Mod: 25,S$GLB,, | Performed by: INTERNAL MEDICINE

## 2023-05-22 PROCEDURE — 1160F PR REVIEW ALL MEDS BY PRESCRIBER/CLIN PHARMACIST DOCUMENTED: ICD-10-PCS | Mod: CPTII,S$GLB,, | Performed by: INTERNAL MEDICINE

## 2023-05-22 PROCEDURE — 36415 COLL VENOUS BLD VENIPUNCTURE: CPT | Performed by: INTERNAL MEDICINE

## 2023-05-22 PROCEDURE — 86803 HEPATITIS C AB TEST: CPT | Performed by: INTERNAL MEDICINE

## 2023-05-22 PROCEDURE — 3044F PR MOST RECENT HEMOGLOBIN A1C LEVEL <7.0%: ICD-10-PCS | Mod: CPTII,S$GLB,, | Performed by: INTERNAL MEDICINE

## 2023-05-22 PROCEDURE — 1159F PR MEDICATION LIST DOCUMENTED IN MEDICAL RECORD: ICD-10-PCS | Mod: CPTII,S$GLB,, | Performed by: INTERNAL MEDICINE

## 2023-05-22 PROCEDURE — 3075F PR MOST RECENT SYSTOLIC BLOOD PRESS GE 130-139MM HG: ICD-10-PCS | Mod: CPTII,S$GLB,, | Performed by: INTERNAL MEDICINE

## 2023-05-22 PROCEDURE — 1159F MED LIST DOCD IN RCRD: CPT | Mod: CPTII,S$GLB,, | Performed by: INTERNAL MEDICINE

## 2023-05-22 RX ORDER — KETOROLAC TROMETHAMINE 30 MG/ML
30 INJECTION, SOLUTION INTRAMUSCULAR; INTRAVENOUS
Status: COMPLETED | OUTPATIENT
Start: 2023-05-22 | End: 2023-05-22

## 2023-05-22 RX ORDER — CYCLOBENZAPRINE HCL 5 MG
5 TABLET ORAL 3 TIMES DAILY PRN
Qty: 20 TABLET | Refills: 0 | Status: SHIPPED | OUTPATIENT
Start: 2023-05-22 | End: 2023-06-01

## 2023-05-22 RX ADMIN — KETOROLAC TROMETHAMINE 30 MG: 30 INJECTION, SOLUTION INTRAMUSCULAR; INTRAVENOUS at 04:05

## 2023-05-22 NOTE — PROGRESS NOTES
HISTORY OF PRESENT ILLNESS:    Unique Cody is a 32 y.o. female  Patient's last menstrual period was 2023. presents today complaining of urinary incontinence.  She reports urgency and stress incontinence symptoms.    Past Medical History:   Diagnosis Date    Kidney stone        Past Surgical History:   Procedure Laterality Date    DIAGNOSTIC LAPAROSCOPY      KNEE ARTHROPLASTY Right        MEDICATIONS AND ALLERGIES:      Current Outpatient Medications:     acetaminophen (TYLENOL) 650 MG TbSR, Take 1 tablet (650 mg total) by mouth every 8 (eight) hours., Disp: , Rfl: 0    cyclobenzaprine (FLEXERIL) 10 MG tablet, Take 10 mg by mouth 3 (three) times daily as needed., Disp: , Rfl:     ibuprofen (ADVIL,MOTRIN) 800 MG tablet, Take 1 tablet (800 mg total) by mouth 3 (three) times daily. As needed for pelvic pain, Disp: 30 tablet, Rfl: 1    Review of patient's allergies indicates:  No Known Allergies    COMPREHENSIVE GYN HISTORY:  PAP History: Denies abnormal Paps.  Infection History: Denies STDs. Denies PID.  Benign History: Denies uterine fibroids. Denies ovarian cysts. Denies endometriosis. Denies other conditions.  Cancer History: Denies cervical cancer. Denies uterine cancer or hyperplasia. Denies ovarian cancer. Denies vulvar cancer or pre-cancer. Denies vaginal cancer or pre-cancer. Denies breast cancer. Denies colon cancer.  Sexual Activity History: Reports currently being sexually active  Menstrual History: Every 28 days, flows for 4 days. Light flow.  Dysmenorrhea History: Denies dysmenorrhea.    ROS:  GENERAL: No fever or chills.  BREASTS: No pain. No lumps. No discharge.  ABDOMEN: No pain. No nausea. No vomiting. No diarrhea. No constipation.  REPRODUCTIVE: No abnormal bleeding.   VULVA: No pain. No lesions. No itching.  VAGINA: No relaxation. No itching. No odor. No discharge. No lesions.  URINARY: No incontinence. No nocturia. No frequency. No dysuria.    /82   Wt 78.4 kg (172 lb 11.7 oz)    LMP 04/28/2023   BMI 27.05 kg/m²     PE:  APPEARANCE: Well nourished, well developed, in no acute distress.  AFFECT: WNL, alert and oriented x 3.  ABDOMEN: Soft. No tenderness or masses. No hepatosplenomegaly. No hernias.  BREASTS: Symmetrical, no skin changes or visible lesions. No palpable masses, nipple discharge bilaterally.  PELVIC: Normal external female genitalia without lesions. Normal hair distribution. Adequate perineal body, normal urethral meatus. Vagina pink and well rugated without lesions or discharge. Cervix pink without lesions, discharge or tenderness. No significant cystocele or rectocele. Bimanual exam shows uterus to be 4-6 weeks size, regular, mobile and nontender. Adnexa without masses or tenderness.    PROCEDURES:    1. Urinary incontinence, unspecified type      PLAN:    Orders Placed This Encounter    Urine culture       COUNSELING:  The patient was counseled today on:     addressing problems separate from annual exam.  This includes face to face time and non-face to face time preparing to see the patient (eg, review of tests), Obtaining and/or reviewing separately obtained history, Documenting clinical information in the electronic or other health record, Independently interpreting resultsand communicating results to the patient/family/caregiver, or Care coordination.     FOLLOW-UP with me for a well-woman exam

## 2023-05-22 NOTE — PROGRESS NOTES
"Subjective:       Patient ID: Unique Cody is a 32 y.o. female.    Chief Complaint: Chills, Headache, Emesis, Diarrhea, Chest Pain, Dizziness, and Cough    Here for urgent visit and to establish care      48 hours prior 2 hrs after eating chili cheese dog she developed n/v, abd pain epigastric, neck pain, diarrhea the following day. Also suffering from HA, chest pains described as an intermittent, lasting 10-15 seconds at a time, left substernal.     She would like std screening. She denies any known exposure, vaginal discharge, dysuria, urinary frequency or urgency, vulvar swelling or pain, rash, or abd pain.        Review of Systems   Constitutional:  Positive for fatigue. Negative for chills, fever and unexpected weight change.   HENT:  Negative for ear pain, hearing loss, postnasal drip, tinnitus, trouble swallowing and voice change.    Respiratory:  Positive for cough and chest tightness. Negative for shortness of breath and wheezing.    Cardiovascular:  Negative for chest pain, palpitations and leg swelling.   Gastrointestinal:  Positive for abdominal pain, diarrhea, nausea and vomiting. Negative for abdominal distention and blood in stool.   Endocrine: Negative for polydipsia, polyphagia and polyuria.   Genitourinary:  Negative for difficulty urinating, dysuria, hematuria and vaginal bleeding.   Skin:  Negative for rash.   Allergic/Immunologic: Negative for food allergies.   Neurological:  Negative for dizziness, numbness and headaches.   Hematological:  Does not bruise/bleed easily.   Psychiatric/Behavioral:  The patient is not nervous/anxious.      Objective:      Vitals:    05/22/23 1522   BP: 134/88   Pulse: 95   SpO2: 98%   Weight: 76.6 kg (168 lb 14 oz)   Height: 5' 7" (1.702 m)      Physical Exam  Vitals and nursing note reviewed.   Constitutional:       General: She is not in acute distress.     Appearance: Normal appearance. She is well-developed.   HENT:      Head: Normocephalic and atraumatic.    "   Mouth/Throat:      Pharynx: No oropharyngeal exudate.   Eyes:      General: No scleral icterus.     Conjunctiva/sclera: Conjunctivae normal.      Pupils: Pupils are equal, round, and reactive to light.   Neck:      Thyroid: No thyromegaly.   Cardiovascular:      Rate and Rhythm: Normal rate and regular rhythm.      Heart sounds: Normal heart sounds. No murmur heard.  Pulmonary:      Effort: Pulmonary effort is normal.      Breath sounds: Normal breath sounds. No wheezing or rales.   Abdominal:      General: There is no distension.   Musculoskeletal:         General: No tenderness.   Lymphadenopathy:      Cervical: No cervical adenopathy.   Skin:     General: Skin is warm and dry.   Neurological:      Mental Status: She is alert and oriented to person, place, and time.   Psychiatric:         Behavior: Behavior normal.       Assessment:       1. Cough, unspecified type    2. Gastroenteritis    3. Acute non intractable tension-type headache    4. Screening examination for STD (sexually transmitted disease)    5. Neck pain, bilateral        Plan:       Unique was seen today for chills, headache, emesis, diarrhea, chest pain, dizziness and cough.    Diagnoses and all orders for this visit:    Cough, unspecified type  -     POCT Influenza A/B Molecular  -     COVID-19 Routine Screening    Gastroenteritis   Presumed viral. OTC meds discussed.  Prompts to call office discussed.    Acute non intractable tension-type headache  -     ketorolac injection 30 mg    Screening examination for STD (sexually transmitted disease)  -     Hepatitis C Antibody; Future  -     HIV 1/2 Ag/Ab (4th Gen); Future  -     Hepatitis B Surface Antigen; Future  -     RPR; Future  -     C. trachomatis/N. gonorrhoeae by AMP DNA; Future    Neck pain, bilateral  -     cyclobenzaprine (FLEXERIL) 5 MG tablet; Take 1 tablet (5 mg total) by mouth 3 (three) times daily as needed for Muscle spasms.           Yao Lozano MD  Internal Medicine-[facility]  [facility]        Side effects of medication(s) were discussed in detail and patient voiced understanding.  Patient will call back for any issues or complications.

## 2023-05-22 NOTE — PROGRESS NOTES
After obtaining verbal consent from the patient, and per orders of Dr. Chacon, injection of ketorloc 30 mg Lot [id number] Exp 6/30/23 given in the RD by CECILIA MACARIO. Patient tolerated well and band aid applied. Patient instructed to remain in clinic for 15 minutes afterwards, and to report any adverse reaction to me immediately.

## 2023-09-06 ENCOUNTER — OFFICE VISIT (OUTPATIENT)
Dept: OBSTETRICS AND GYNECOLOGY | Facility: CLINIC | Age: 33
End: 2023-09-06
Payer: COMMERCIAL

## 2023-09-06 VITALS
SYSTOLIC BLOOD PRESSURE: 112 MMHG | WEIGHT: 176.56 LBS | HEIGHT: 67 IN | DIASTOLIC BLOOD PRESSURE: 76 MMHG | BODY MASS INDEX: 27.71 KG/M2

## 2023-09-06 DIAGNOSIS — N89.8 VAGINAL DISCHARGE: ICD-10-CM

## 2023-09-06 DIAGNOSIS — Z11.3 SCREEN FOR STD (SEXUALLY TRANSMITTED DISEASE): ICD-10-CM

## 2023-09-06 DIAGNOSIS — Z01.419 ENCOUNTER FOR GYNECOLOGICAL EXAMINATION WITHOUT ABNORMAL FINDING: Primary | ICD-10-CM

## 2023-09-06 DIAGNOSIS — R35.0 URINARY FREQUENCY: ICD-10-CM

## 2023-09-06 PROCEDURE — 3074F PR MOST RECENT SYSTOLIC BLOOD PRESSURE < 130 MM HG: ICD-10-PCS | Mod: CPTII,S$GLB,, | Performed by: OBSTETRICS & GYNECOLOGY

## 2023-09-06 PROCEDURE — 3008F PR BODY MASS INDEX (BMI) DOCUMENTED: ICD-10-PCS | Mod: CPTII,S$GLB,, | Performed by: OBSTETRICS & GYNECOLOGY

## 2023-09-06 PROCEDURE — 3074F SYST BP LT 130 MM HG: CPT | Mod: CPTII,S$GLB,, | Performed by: OBSTETRICS & GYNECOLOGY

## 2023-09-06 PROCEDURE — 3078F DIAST BP <80 MM HG: CPT | Mod: CPTII,S$GLB,, | Performed by: OBSTETRICS & GYNECOLOGY

## 2023-09-06 PROCEDURE — 81514 NFCT DS BV&VAGINITIS DNA ALG: CPT | Performed by: OBSTETRICS & GYNECOLOGY

## 2023-09-06 PROCEDURE — 87591 N.GONORRHOEAE DNA AMP PROB: CPT | Performed by: OBSTETRICS & GYNECOLOGY

## 2023-09-06 PROCEDURE — 3044F HG A1C LEVEL LT 7.0%: CPT | Mod: CPTII,S$GLB,, | Performed by: OBSTETRICS & GYNECOLOGY

## 2023-09-06 PROCEDURE — 99395 PREV VISIT EST AGE 18-39: CPT | Mod: S$GLB,,, | Performed by: OBSTETRICS & GYNECOLOGY

## 2023-09-06 PROCEDURE — 99999 PR PBB SHADOW E&M-EST. PATIENT-LVL III: ICD-10-PCS | Mod: PBBFAC,,, | Performed by: OBSTETRICS & GYNECOLOGY

## 2023-09-06 PROCEDURE — 3008F BODY MASS INDEX DOCD: CPT | Mod: CPTII,S$GLB,, | Performed by: OBSTETRICS & GYNECOLOGY

## 2023-09-06 PROCEDURE — 3078F PR MOST RECENT DIASTOLIC BLOOD PRESSURE < 80 MM HG: ICD-10-PCS | Mod: CPTII,S$GLB,, | Performed by: OBSTETRICS & GYNECOLOGY

## 2023-09-06 PROCEDURE — 99395 PR PREVENTIVE VISIT,EST,18-39: ICD-10-PCS | Mod: S$GLB,,, | Performed by: OBSTETRICS & GYNECOLOGY

## 2023-09-06 PROCEDURE — 3044F PR MOST RECENT HEMOGLOBIN A1C LEVEL <7.0%: ICD-10-PCS | Mod: CPTII,S$GLB,, | Performed by: OBSTETRICS & GYNECOLOGY

## 2023-09-06 PROCEDURE — 87086 URINE CULTURE/COLONY COUNT: CPT | Performed by: OBSTETRICS & GYNECOLOGY

## 2023-09-06 PROCEDURE — 1160F PR REVIEW ALL MEDS BY PRESCRIBER/CLIN PHARMACIST DOCUMENTED: ICD-10-PCS | Mod: CPTII,S$GLB,, | Performed by: OBSTETRICS & GYNECOLOGY

## 2023-09-06 PROCEDURE — 1159F MED LIST DOCD IN RCRD: CPT | Mod: CPTII,S$GLB,, | Performed by: OBSTETRICS & GYNECOLOGY

## 2023-09-06 PROCEDURE — 1159F PR MEDICATION LIST DOCUMENTED IN MEDICAL RECORD: ICD-10-PCS | Mod: CPTII,S$GLB,, | Performed by: OBSTETRICS & GYNECOLOGY

## 2023-09-06 PROCEDURE — 99999 PR PBB SHADOW E&M-EST. PATIENT-LVL III: CPT | Mod: PBBFAC,,, | Performed by: OBSTETRICS & GYNECOLOGY

## 2023-09-06 PROCEDURE — 1160F RVW MEDS BY RX/DR IN RCRD: CPT | Mod: CPTII,S$GLB,, | Performed by: OBSTETRICS & GYNECOLOGY

## 2023-09-06 RX ORDER — NITROFURANTOIN 25; 75 MG/1; MG/1
100 CAPSULE ORAL 2 TIMES DAILY
Qty: 14 CAPSULE | Refills: 0 | Status: SHIPPED | OUTPATIENT
Start: 2023-09-06 | End: 2023-09-13

## 2023-09-06 NOTE — PROGRESS NOTES
HISTORY OF PRESENT ILLNESS:    Unique Cody is a 32 y.o. female, , Patient's last menstrual period was 2023.,  presents for a routine exam and has no complaints.  Patient reports cycles occur every 4 weeks lasting 5-7 days using 3-4 pads per day.   Occasionally would skip cycles or last 10-14 days   She denies any BTB.     History of abnormal pap: No  Last Pap:   Last MMG: N/A  Last Colonoscopy: N/A     She uses no birth control.   She does not desire STD screening.    The patient participates in regular exercise: yes.    The patient does not smoke.    The patient wears seatbelts.     Pt denies any domestic violence.     - EMBX     EXAMINATION:  US PELVIS COMP WITH TRANSVAG NON-OB (XPD)     CLINICAL HISTORY:  Unspecified ovarian cyst, unspecified side     TECHNIQUE:  Transabdominal sonography of the pelvis was performed, followed by transvaginal sonography to better evaluate the uterus and ovaries.     COMPARISON:  Pelvic ultrasound dated 10/06/2022     FINDINGS:  Uterus: Size: 7.5 x 3.2 x 4.0 cm  Masses: None  Endometrium: Normal in this pre menopausal patient, measuring 8 mm.     Right ovary: Size: 3.4 x 4.3 x 1.4 cm  Appearance: Follicles noted.  Vascular flow: Present.     Left ovary: Size: 4.5 x 4.0 x 3.1 cm  Appearance: Follicles noted.  Previous left ovarian cyst containing daughter cyst is no longer visualized.  Complex anechoic area with internal lace-like reticulation measuring 2.1 x 2.0 x 2.1 cm.  No suspicious internal vascularity.     Vascular Flow: Present     Free Fluid: None.     Impression:    Normal thickness and appearance of the endometrial stripe.  Previous left ovarian cyst (containing daughter cyst) is no longer visualized.  Complex anechoic area in the left ovary with internal lace-like reticulation.  Appearance most in keeping with hemorrhagic cyst.     Electronically signed by: Yao Gimenez  Date:                                            2022  Time:                                            11:46    Past Medical History:   Diagnosis Date    Kidney stone      Past Surgical History:   Procedure Laterality Date    DIAGNOSTIC LAPAROSCOPY      KNEE ARTHROPLASTY Right        MEDICATIONS AND ALLERGIES:    Current Outpatient Medications:     acetaminophen (TYLENOL) 650 MG TbSR, Take 1 tablet (650 mg total) by mouth every 8 (eight) hours., Disp: , Rfl: 0    ibuprofen (ADVIL,MOTRIN) 800 MG tablet, Take 1 tablet (800 mg total) by mouth 3 (three) times daily. As needed for pelvic pain, Disp: 30 tablet, Rfl: 1    nitrofurantoin, macrocrystal-monohydrate, (MACROBID) 100 MG capsule, Take 1 capsule (100 mg total) by mouth 2 (two) times daily. for 7 days, Disp: 14 capsule, Rfl: 0    Review of patient's allergies indicates:  No Known Allergies    Family History   Problem Relation Age of Onset    Hypertension Mother     Blindness Neg Hx     Amblyopia Neg Hx     Cancer Neg Hx     Cataracts Neg Hx     Diabetes Neg Hx     Glaucoma Neg Hx     Macular degeneration Neg Hx     Retinal detachment Neg Hx     Strabismus Neg Hx     Stroke Neg Hx     Thyroid disease Neg Hx     Breast cancer Neg Hx     Colon cancer Neg Hx     Ovarian cancer Neg Hx        Social History     Socioeconomic History    Marital status: Single   Tobacco Use    Smoking status: Never    Smokeless tobacco: Never   Substance and Sexual Activity    Alcohol use: No    Drug use: No    Sexual activity: Yes     Partners: Male     Birth control/protection: None     COMPREHENSIVE GYN HISTORY:  PAP History: Denies abnormal Paps.  Infection History: Denies STDs. Denies PID.  Benign History: Denies uterine fibroids. Denies ovarian cysts. Denies endometriosis. Denies other conditions.  Cancer History: Denies cervical cancer. Denies uterine cancer or hyperplasia. Denies ovarian cancer. Denies vulvar cancer or pre-cancer. Denies vaginal cancer or pre-cancer. Denies breast cancer. Denies colon cancer.  Sexual Activity History: Reports  "currently being sexually active  Menstrual History: Monthly. Mod then light flow.   Dysmenorrhea History: Reports mild dysmenorrhea.     ROS:  GENERAL: No weight changes. No swelling. No fatigue. No fever.  CARDIOVASCULAR: No chest pain. No shortness of breath. No leg cramps.   NEUROLOGICAL: No headaches. No vision changes.  BREASTS: No pain. No lumps. No discharge.  ABDOMEN: No pain. No nausea. No vomiting. No diarrhea. No constipation.  REPRODUCTIVE: No abnormal bleeding.   VULVA: No pain. No lesions. No itching.  VAGINA: No relaxation. No itching. No odor. No discharge. No lesions.  URINARY: No incontinence. No nocturia. No frequency. No dysuria.    /76   Ht 5' 7" (1.702 m)   Wt 80.1 kg (176 lb 9.4 oz)   LMP 08/16/2023   BMI 27.66 kg/m²     PE:  APPEARANCE: Well nourished, well developed, in no acute distress.  AFFECT: WNL, alert and oriented x 3.  SKIN: No acne or hirsutism.  NECK: Neck symmetric, without masses or thyromegaly.  NODES: No inguinal, cervical, axillary or femoral lymph node enlargement.  CHEST: Good respiratory effort.   ABDOMEN: Soft. No tenderness or masses. No hepatosplenomegaly. No hernias.  BREASTS: Symmetrical, no skin changes, visible lesions, palpable masses or nipple discharge bilaterally.  PELVIC: External female genitalia without lesions.  Female hair distribution. Adequate perineal body, Normal urethral meatus. Vagina moist and well rugated without lesions or discharge.  No significant cystocele or rectocele present. Cervix pink without lesions, discharge or tenderness. Uterus is 4-6 week size, regular, mobile and nontender. Adnexa without masses or tenderness.  EXTREMITIES: No edema    DIAGNOSIS:  1. Encounter for gynecological examination without abnormal finding    2. Vaginal discharge    3. Urinary frequency    4. Screen for STD (sexually transmitted disease)        PLAN:    Orders Placed This Encounter    C. trachomatis/N. gonorrhoeae by AMP DNA    Vaginosis Screen by " DNA Probe    Urine culture    Hepatitis Panel, Acute    HIV 1/2 Ag/Ab (4th Gen)    RPR    nitrofurantoin, macrocrystal-monohydrate, (MACROBID) 100 MG capsule       COUNSELING:  The patient was counseled today on:  -A.C.S. Pap and pelvic exam guidelines (pap every 3 years), recomendations for yearly mammogram;  -to follow up with her PCP for other health maintenance.    FOLLOW-UP with me for EMBX

## 2023-09-07 ENCOUNTER — LAB VISIT (OUTPATIENT)
Dept: LAB | Facility: HOSPITAL | Age: 33
End: 2023-09-07
Attending: OBSTETRICS & GYNECOLOGY
Payer: COMMERCIAL

## 2023-09-07 DIAGNOSIS — Z11.3 SCREEN FOR STD (SEXUALLY TRANSMITTED DISEASE): ICD-10-CM

## 2023-09-07 LAB
BACTERIA UR CULT: NORMAL
BACTERIAL VAGINOSIS DNA: NEGATIVE
C TRACH DNA SPEC QL NAA+PROBE: NOT DETECTED
CANDIDA GLABRATA DNA: NEGATIVE
CANDIDA KRUSEI DNA: NEGATIVE
CANDIDA RRNA VAG QL PROBE: NEGATIVE
HAV IGM SERPL QL IA: NORMAL
HBV CORE IGM SERPL QL IA: NORMAL
HBV SURFACE AG SERPL QL IA: NORMAL
HCV AB SERPL QL IA: NORMAL
HIV 1+2 AB+HIV1 P24 AG SERPL QL IA: NORMAL
N GONORRHOEA DNA SPEC QL NAA+PROBE: NOT DETECTED
T VAGINALIS RRNA GENITAL QL PROBE: NEGATIVE

## 2023-09-07 PROCEDURE — 80074 ACUTE HEPATITIS PANEL: CPT | Mod: PO | Performed by: OBSTETRICS & GYNECOLOGY

## 2023-09-07 PROCEDURE — 87389 HIV-1 AG W/HIV-1&-2 AB AG IA: CPT | Mod: PO | Performed by: OBSTETRICS & GYNECOLOGY

## 2023-09-07 PROCEDURE — 36415 COLL VENOUS BLD VENIPUNCTURE: CPT | Mod: PO | Performed by: OBSTETRICS & GYNECOLOGY

## 2023-09-07 PROCEDURE — 86592 SYPHILIS TEST NON-TREP QUAL: CPT | Mod: PO | Performed by: OBSTETRICS & GYNECOLOGY

## 2023-09-08 LAB — RPR SER QL: NORMAL

## 2023-10-25 ENCOUNTER — LAB VISIT (OUTPATIENT)
Dept: LAB | Facility: HOSPITAL | Age: 33
End: 2023-10-25
Attending: STUDENT IN AN ORGANIZED HEALTH CARE EDUCATION/TRAINING PROGRAM
Payer: COMMERCIAL

## 2023-10-25 ENCOUNTER — OFFICE VISIT (OUTPATIENT)
Dept: INTERNAL MEDICINE | Facility: CLINIC | Age: 33
End: 2023-10-25
Payer: COMMERCIAL

## 2023-10-25 VITALS
HEIGHT: 67 IN | DIASTOLIC BLOOD PRESSURE: 72 MMHG | BODY MASS INDEX: 27.3 KG/M2 | OXYGEN SATURATION: 98 % | RESPIRATION RATE: 14 BRPM | WEIGHT: 173.94 LBS | SYSTOLIC BLOOD PRESSURE: 122 MMHG | HEART RATE: 68 BPM

## 2023-10-25 DIAGNOSIS — R10.31 RIGHT LOWER QUADRANT ABDOMINAL PAIN: ICD-10-CM

## 2023-10-25 DIAGNOSIS — R43.8 METALLIC TASTE: ICD-10-CM

## 2023-10-25 LAB
ALBUMIN SERPL BCP-MCNC: 4 G/DL (ref 3.5–5.2)
ALP SERPL-CCNC: 72 U/L (ref 55–135)
ALT SERPL W/O P-5'-P-CCNC: 13 U/L (ref 10–44)
ANION GAP SERPL CALC-SCNC: 9 MMOL/L (ref 8–16)
AST SERPL-CCNC: 19 U/L (ref 10–40)
B-HCG UR QL: NEGATIVE
BASOPHILS # BLD AUTO: 0.03 K/UL (ref 0–0.2)
BASOPHILS NFR BLD: 0.6 % (ref 0–1.9)
BILIRUB SERPL-MCNC: 0.2 MG/DL (ref 0.1–1)
BUN SERPL-MCNC: 12 MG/DL (ref 6–20)
CALCIUM SERPL-MCNC: 9.1 MG/DL (ref 8.7–10.5)
CHLORIDE SERPL-SCNC: 103 MMOL/L (ref 95–110)
CO2 SERPL-SCNC: 26 MMOL/L (ref 23–29)
CREAT SERPL-MCNC: 0.9 MG/DL (ref 0.5–1.4)
CTP QC/QA: YES
DIFFERENTIAL METHOD: ABNORMAL
EOSINOPHIL # BLD AUTO: 0.1 K/UL (ref 0–0.5)
EOSINOPHIL NFR BLD: 1.1 % (ref 0–8)
ERYTHROCYTE [DISTWIDTH] IN BLOOD BY AUTOMATED COUNT: 13.7 % (ref 11.5–14.5)
EST. GFR  (NO RACE VARIABLE): >60 ML/MIN/1.73 M^2
GLUCOSE SERPL-MCNC: 86 MG/DL (ref 70–110)
HCT VFR BLD AUTO: 39.7 % (ref 37–48.5)
HGB BLD-MCNC: 12.6 G/DL (ref 12–16)
IMM GRANULOCYTES # BLD AUTO: 0 K/UL (ref 0–0.04)
IMM GRANULOCYTES NFR BLD AUTO: 0 % (ref 0–0.5)
LYMPHOCYTES # BLD AUTO: 1.9 K/UL (ref 1–4.8)
LYMPHOCYTES NFR BLD: 34.6 % (ref 18–48)
MCH RBC QN AUTO: 27.4 PG (ref 27–31)
MCHC RBC AUTO-ENTMCNC: 31.7 G/DL (ref 32–36)
MCV RBC AUTO: 86 FL (ref 82–98)
MONOCYTES # BLD AUTO: 0.4 K/UL (ref 0.3–1)
MONOCYTES NFR BLD: 8 % (ref 4–15)
NEUTROPHILS # BLD AUTO: 3 K/UL (ref 1.8–7.7)
NEUTROPHILS NFR BLD: 55.7 % (ref 38–73)
NRBC BLD-RTO: 0 /100 WBC
PLATELET # BLD AUTO: 355 K/UL (ref 150–450)
PMV BLD AUTO: 10.7 FL (ref 9.2–12.9)
POTASSIUM SERPL-SCNC: 3.9 MMOL/L (ref 3.5–5.1)
PROT SERPL-MCNC: 7.4 G/DL (ref 6–8.4)
RBC # BLD AUTO: 4.6 M/UL (ref 4–5.4)
SODIUM SERPL-SCNC: 138 MMOL/L (ref 136–145)
TSH SERPL DL<=0.005 MIU/L-ACNC: 1.26 UIU/ML (ref 0.4–4)
WBC # BLD AUTO: 5.37 K/UL (ref 3.9–12.7)

## 2023-10-25 PROCEDURE — 1159F PR MEDICATION LIST DOCUMENTED IN MEDICAL RECORD: ICD-10-PCS | Mod: CPTII,S$GLB,, | Performed by: STUDENT IN AN ORGANIZED HEALTH CARE EDUCATION/TRAINING PROGRAM

## 2023-10-25 PROCEDURE — 99999 PR PBB SHADOW E&M-EST. PATIENT-LVL III: CPT | Mod: PBBFAC,,, | Performed by: STUDENT IN AN ORGANIZED HEALTH CARE EDUCATION/TRAINING PROGRAM

## 2023-10-25 PROCEDURE — 3078F DIAST BP <80 MM HG: CPT | Mod: CPTII,S$GLB,, | Performed by: STUDENT IN AN ORGANIZED HEALTH CARE EDUCATION/TRAINING PROGRAM

## 2023-10-25 PROCEDURE — 99214 OFFICE O/P EST MOD 30 MIN: CPT | Mod: S$GLB,,, | Performed by: STUDENT IN AN ORGANIZED HEALTH CARE EDUCATION/TRAINING PROGRAM

## 2023-10-25 PROCEDURE — 80053 COMPREHEN METABOLIC PANEL: CPT | Performed by: STUDENT IN AN ORGANIZED HEALTH CARE EDUCATION/TRAINING PROGRAM

## 2023-10-25 PROCEDURE — 3008F PR BODY MASS INDEX (BMI) DOCUMENTED: ICD-10-PCS | Mod: CPTII,S$GLB,, | Performed by: STUDENT IN AN ORGANIZED HEALTH CARE EDUCATION/TRAINING PROGRAM

## 2023-10-25 PROCEDURE — 84443 ASSAY THYROID STIM HORMONE: CPT | Performed by: STUDENT IN AN ORGANIZED HEALTH CARE EDUCATION/TRAINING PROGRAM

## 2023-10-25 PROCEDURE — 1159F MED LIST DOCD IN RCRD: CPT | Mod: CPTII,S$GLB,, | Performed by: STUDENT IN AN ORGANIZED HEALTH CARE EDUCATION/TRAINING PROGRAM

## 2023-10-25 PROCEDURE — 99214 PR OFFICE/OUTPT VISIT, EST, LEVL IV, 30-39 MIN: ICD-10-PCS | Mod: S$GLB,,, | Performed by: STUDENT IN AN ORGANIZED HEALTH CARE EDUCATION/TRAINING PROGRAM

## 2023-10-25 PROCEDURE — 81025 POCT URINE PREGNANCY: ICD-10-PCS | Mod: S$GLB,,, | Performed by: STUDENT IN AN ORGANIZED HEALTH CARE EDUCATION/TRAINING PROGRAM

## 2023-10-25 PROCEDURE — 84630 ASSAY OF ZINC: CPT | Performed by: STUDENT IN AN ORGANIZED HEALTH CARE EDUCATION/TRAINING PROGRAM

## 2023-10-25 PROCEDURE — 3074F SYST BP LT 130 MM HG: CPT | Mod: CPTII,S$GLB,, | Performed by: STUDENT IN AN ORGANIZED HEALTH CARE EDUCATION/TRAINING PROGRAM

## 2023-10-25 PROCEDURE — 3008F BODY MASS INDEX DOCD: CPT | Mod: CPTII,S$GLB,, | Performed by: STUDENT IN AN ORGANIZED HEALTH CARE EDUCATION/TRAINING PROGRAM

## 2023-10-25 PROCEDURE — 3078F PR MOST RECENT DIASTOLIC BLOOD PRESSURE < 80 MM HG: ICD-10-PCS | Mod: CPTII,S$GLB,, | Performed by: STUDENT IN AN ORGANIZED HEALTH CARE EDUCATION/TRAINING PROGRAM

## 2023-10-25 PROCEDURE — 3044F PR MOST RECENT HEMOGLOBIN A1C LEVEL <7.0%: ICD-10-PCS | Mod: CPTII,S$GLB,, | Performed by: STUDENT IN AN ORGANIZED HEALTH CARE EDUCATION/TRAINING PROGRAM

## 2023-10-25 PROCEDURE — 85025 COMPLETE CBC W/AUTO DIFF WBC: CPT | Performed by: STUDENT IN AN ORGANIZED HEALTH CARE EDUCATION/TRAINING PROGRAM

## 2023-10-25 PROCEDURE — 99999 PR PBB SHADOW E&M-EST. PATIENT-LVL III: ICD-10-PCS | Mod: PBBFAC,,, | Performed by: STUDENT IN AN ORGANIZED HEALTH CARE EDUCATION/TRAINING PROGRAM

## 2023-10-25 PROCEDURE — 81025 URINE PREGNANCY TEST: CPT | Mod: S$GLB,,, | Performed by: STUDENT IN AN ORGANIZED HEALTH CARE EDUCATION/TRAINING PROGRAM

## 2023-10-25 PROCEDURE — 3044F HG A1C LEVEL LT 7.0%: CPT | Mod: CPTII,S$GLB,, | Performed by: STUDENT IN AN ORGANIZED HEALTH CARE EDUCATION/TRAINING PROGRAM

## 2023-10-25 PROCEDURE — 36415 COLL VENOUS BLD VENIPUNCTURE: CPT | Performed by: STUDENT IN AN ORGANIZED HEALTH CARE EDUCATION/TRAINING PROGRAM

## 2023-10-25 PROCEDURE — 3074F PR MOST RECENT SYSTOLIC BLOOD PRESSURE < 130 MM HG: ICD-10-PCS | Mod: CPTII,S$GLB,, | Performed by: STUDENT IN AN ORGANIZED HEALTH CARE EDUCATION/TRAINING PROGRAM

## 2023-10-25 PROCEDURE — 82607 VITAMIN B-12: CPT | Performed by: STUDENT IN AN ORGANIZED HEALTH CARE EDUCATION/TRAINING PROGRAM

## 2023-10-25 NOTE — ASSESSMENT & PLAN NOTE
Did not start anything new. No new imaging. Unsure what the etiology is. Will check b12, zinc, tsh, cbc and cmp and also urine pregnancy test to make sure all of that is normal.

## 2023-10-25 NOTE — ASSESSMENT & PLAN NOTE
Has RLQ pain. Pain with palpation. Will rule out pregnancy. Can do a pelvic U/S since she has pain in the pelvic region too. She is seeing her gyn soon for irregular periods.

## 2023-10-25 NOTE — PROGRESS NOTES
Subjective     Patient ID: Unique Cody is a 32 y.o. female.    Chief Complaint: Abdominal Pain and METAL TASTE    Abdominal Pain  Pertinent negatives include no constipation, diarrhea, dysuria, fever, headaches, hematuria, nausea or vomiting.     Patient is a 33 yo female here for abd pain, metallic taste in her mouth and excessive salivation. Pt does not think she could be pregnant.   -abd pain started 10 days ago. Located RLQ. Sharp pain. Staying the same, no radiation, 6/10, intermittent. Never had this pain before. She had kidney stone in the past but this does not feel like that. Denies constipation, diarrhea, fever, chills, N/V. She is seeing her ob gyn next week.   -metallic taste started 4 days ago. Denies new medications, no imagings, no dental pain, infection. No OTC meds. No recent URI. Its present no matter what she eats. Denies reflux symptoms. No hx of diabetes or hypothyroidism. Never happened before. Did not test herself for covid but upper resp symptoms. No postnasal drip. No oral thrush. Does not smoke. No tobacco use. Does not use preworkout.   -excessive salivation since yesterday. No matter what she eats. Has to spit constantly.   Review of Systems   Constitutional:  Negative for chills and fever.   HENT:  Negative for postnasal drip, rhinorrhea and sore throat.    Respiratory:  Negative for cough, chest tightness and shortness of breath.    Cardiovascular:  Negative for chest pain.   Gastrointestinal:  Positive for abdominal pain. Negative for blood in stool, constipation, diarrhea, nausea, vomiting and reflux.   Genitourinary:  Negative for dysuria and hematuria.   Neurological:  Negative for dizziness, light-headedness and headaches.          Objective     Physical Exam  Vitals and nursing note reviewed.   Constitutional:       Appearance: Normal appearance.   HENT:      Mouth/Throat:      Mouth: Mucous membranes are moist.      Pharynx: Oropharynx is clear. No oropharyngeal exudate or  posterior oropharyngeal erythema.      Comments: No thrush  Eyes:      Conjunctiva/sclera: Conjunctivae normal.   Cardiovascular:      Rate and Rhythm: Normal rate and regular rhythm.      Heart sounds: Normal heart sounds.   Pulmonary:      Effort: Pulmonary effort is normal. No respiratory distress.   Abdominal:      General: Bowel sounds are normal. There is no distension.      Palpations: Abdomen is soft.      Tenderness: There is no guarding.      Comments: Tenderness to palpation in the RLQ and pelvic area   Musculoskeletal:      Right lower leg: No edema.      Left lower leg: No edema.   Skin:     General: Skin is warm.   Neurological:      Mental Status: She is alert and oriented to person, place, and time.   Psychiatric:         Mood and Affect: Mood normal.         Behavior: Behavior normal.            Assessment and Plan     1. Metallic taste  Assessment & Plan:  Did not start anything new. No new imaging. Unsure what the etiology is. Will check b12, zinc, tsh, cbc and cmp and also urine pregnancy test to make sure all of that is normal.     Orders:  -     VITAMIN B12; Future; Expected date: 10/25/2023  -     ZINC; Future; Expected date: 10/25/2023  -     CBC Auto Differential; Future; Expected date: 10/25/2023  -     TSH; Future; Expected date: 10/25/2023    2. Right lower quadrant abdominal pain  Assessment & Plan:  Has RLQ pain. Pain with palpation. Will rule out pregnancy. Can do a pelvic U/S since she has pain in the pelvic region too. She is seeing her gyn soon for irregular periods.     Orders:  -     POCT Urine Pregnancy  -     Comprehensive Metabolic Panel; Future; Expected date: 10/25/2023  -     US Pelvis Complete Non OB; Future; Expected date: 10/25/2023

## 2023-10-26 ENCOUNTER — PATIENT MESSAGE (OUTPATIENT)
Dept: INTERNAL MEDICINE | Facility: CLINIC | Age: 33
End: 2023-10-26

## 2023-10-26 DIAGNOSIS — G62.9 NEUROPATHY: Primary | ICD-10-CM

## 2023-10-26 LAB — VIT B12 SERPL-MCNC: 1375 PG/ML (ref 210–950)

## 2023-10-27 ENCOUNTER — TELEPHONE (OUTPATIENT)
Dept: INTERNAL MEDICINE | Facility: CLINIC | Age: 33
End: 2023-10-27

## 2023-10-27 NOTE — TELEPHONE ENCOUNTER
----- Message from Sharon Fajardo sent at 10/27/2023  3:50 PM CDT -----  Contact: [phone]  2TESTRESULTS    Type: Test Results    What test was performed? labs    Who ordered the test?Dr Jacobs    When and where were the test performed? [facility] 10/25/23    Would you like response via [employer]: CALL     Comments:

## 2023-10-30 ENCOUNTER — TELEPHONE (OUTPATIENT)
Dept: OBSTETRICS AND GYNECOLOGY | Facility: CLINIC | Age: 33
End: 2023-10-30

## 2023-10-30 ENCOUNTER — LAB VISIT (OUTPATIENT)
Dept: LAB | Facility: HOSPITAL | Age: 33
End: 2023-10-30
Attending: STUDENT IN AN ORGANIZED HEALTH CARE EDUCATION/TRAINING PROGRAM
Payer: COMMERCIAL

## 2023-10-30 DIAGNOSIS — G62.9 NEUROPATHY: ICD-10-CM

## 2023-10-30 LAB
CRP SERPL-MCNC: 0.4 MG/L (ref 0–8.2)
ERYTHROCYTE [SEDIMENTATION RATE] IN BLOOD BY PHOTOMETRIC METHOD: 4 MM/HR (ref 0–36)
ZINC SERPL-MCNC: 67 UG/DL (ref 60–130)

## 2023-10-30 PROCEDURE — 86140 C-REACTIVE PROTEIN: CPT | Performed by: STUDENT IN AN ORGANIZED HEALTH CARE EDUCATION/TRAINING PROGRAM

## 2023-10-30 PROCEDURE — 85652 RBC SED RATE AUTOMATED: CPT | Performed by: STUDENT IN AN ORGANIZED HEALTH CARE EDUCATION/TRAINING PROGRAM

## 2023-10-30 PROCEDURE — 36415 COLL VENOUS BLD VENIPUNCTURE: CPT | Performed by: STUDENT IN AN ORGANIZED HEALTH CARE EDUCATION/TRAINING PROGRAM

## 2023-10-30 NOTE — TELEPHONE ENCOUNTER
Discussed labs with the patient. B12 elevated. Asked her to not take any supplements other than d3. She has not been taking supplements for last 2 months. Having tingling, numbness and pins and needles sensation in her feet. Will refer to neurology. The metallic taste has resolved

## 2023-10-30 NOTE — TELEPHONE ENCOUNTER
----- Message from Vida Bryant sent at 10/30/2023  3:00 PM CDT -----  Patient states that she is on schedule for a EMBX procedure and want's to know it she can   change the appointment time. Patient would like for someone to give her a call back in regards  to this matter.    Contact# [phone]                                              Thank you!!!

## 2023-10-31 ENCOUNTER — TELEPHONE (OUTPATIENT)
Dept: INTERNAL MEDICINE | Facility: CLINIC | Age: 33
End: 2023-10-31

## 2023-10-31 NOTE — TELEPHONE ENCOUNTER
----- Message from Aliza Jacobs MD sent at 10/31/2023  9:09 AM CDT -----  Let patient know that the inflammatory markers that I ordered are normal.

## 2023-10-31 NOTE — TELEPHONE ENCOUNTER
Called PT. Informed PT the  did not recheck her B12. Informed PT the  Stated We have to give it some time before rechecking it, which is why we suggest she establish care with a primary care who can check it again in a month or two.

## 2023-10-31 NOTE — TELEPHONE ENCOUNTER
Called PT. Informed PT that the inflammatory markers that the  ordered are normal.         *PT STATED HER B12 LEVEL IS STILL HIGH. PT IS REQUESTING MEDICAL ADVISE.      *PLEASE ADVISE.

## 2023-11-21 ENCOUNTER — TELEPHONE (OUTPATIENT)
Dept: OBSTETRICS AND GYNECOLOGY | Facility: CLINIC | Age: 33
End: 2023-11-21

## 2023-11-21 NOTE — TELEPHONE ENCOUNTER
----- Message from Ky Vee sent at 11/21/2023  8:05 AM CST -----  Regarding: Reschedule  Name of Who is Calling:  Patient          What is the request in detail:  Patient stated that her monthly cycle came down and she would like to reschedule her appointment            Can the clinic reply by [facility]: Yes          What [facility]: [phone]

## 2024-01-03 ENCOUNTER — PROCEDURE VISIT (OUTPATIENT)
Dept: OBSTETRICS AND GYNECOLOGY | Facility: CLINIC | Age: 34
End: 2024-01-03
Payer: COMMERCIAL

## 2024-01-03 VITALS — WEIGHT: 174.19 LBS | BODY MASS INDEX: 27.34 KG/M2 | HEIGHT: 67 IN

## 2024-01-03 DIAGNOSIS — N94.6 DYSMENORRHEA: Primary | ICD-10-CM

## 2024-01-03 DIAGNOSIS — N92.6 IRREGULAR MENSTRUAL CYCLE: ICD-10-CM

## 2024-01-03 LAB
B-HCG UR QL: NEGATIVE
CTP QC/QA: YES

## 2024-01-03 PROCEDURE — 88305 TISSUE EXAM BY PATHOLOGIST: CPT | Performed by: PATHOLOGY

## 2024-01-03 PROCEDURE — 58100 BIOPSY OF UTERUS LINING: CPT | Mod: S$GLB,,, | Performed by: OBSTETRICS & GYNECOLOGY

## 2024-01-03 PROCEDURE — 88305 TISSUE EXAM BY PATHOLOGIST: CPT | Mod: 26,,, | Performed by: PATHOLOGY

## 2024-01-03 PROCEDURE — 81025 URINE PREGNANCY TEST: CPT | Mod: S$GLB,,, | Performed by: OBSTETRICS & GYNECOLOGY

## 2024-01-03 NOTE — PROCEDURES
Biopsy (Gynecological)    Date/Time: 1/3/2024 1:30 PM    Performed by: Belkys Nguyễn MD  Authorized by: Belkys Nguyễn MD    Consent obtained:  Prior to procedure the appropriate consent was completed and verified   Patient was prepped and draped in the normal sterile fashion.  Local anesthesia used?: No      Biopsy Location:  Uterus  Estimated blood loss (cc):  10   Patient tolerated the procedure well with no immediate complications.     Pelvic rest for 2 weeks. Bleeding, pain and fever precautions given.

## 2024-01-08 LAB
COMMENT: NORMAL
FINAL PATHOLOGIC DIAGNOSIS: NORMAL
GROSS: NORMAL
Lab: NORMAL

## 2024-01-09 ENCOUNTER — TELEPHONE (OUTPATIENT)
Dept: OBSTETRICS AND GYNECOLOGY | Facility: CLINIC | Age: 34
End: 2024-01-09
Payer: COMMERCIAL

## 2024-01-24 ENCOUNTER — TELEPHONE (OUTPATIENT)
Dept: PRIMARY CARE CLINIC | Facility: CLINIC | Age: 34
End: 2024-01-24
Payer: COMMERCIAL

## 2024-01-24 ENCOUNTER — HOSPITAL ENCOUNTER (OUTPATIENT)
Dept: RADIOLOGY | Facility: HOSPITAL | Age: 34
Discharge: HOME | End: 2024-01-24
Attending: STUDENT IN AN ORGANIZED HEALTH CARE EDUCATION/TRAINING PROGRAM
Payer: COMMERCIAL

## 2024-01-24 DIAGNOSIS — R10.31 RIGHT LOWER QUADRANT ABDOMINAL PAIN: ICD-10-CM

## 2024-01-24 PROCEDURE — 76856 US EXAM PELVIC COMPLETE: CPT | Mod: TC,PN

## 2024-01-24 PROCEDURE — 76856 US EXAM PELVIC COMPLETE: CPT | Mod: 26,,, | Performed by: RADIOLOGY

## 2024-01-24 NOTE — TELEPHONE ENCOUNTER
----- Message from Aliza Jacobs MD sent at 1/24/2024  1:45 PM CST -----  Pls let patient know that her ultrasound is normal.

## 2024-01-29 ENCOUNTER — TELEPHONE (OUTPATIENT)
Dept: OBSTETRICS AND GYNECOLOGY | Facility: CLINIC | Age: 34
End: 2024-01-29
Payer: COMMERCIAL

## 2024-01-29 NOTE — TELEPHONE ENCOUNTER
----- Message from Frias Funmi Laisha sent at 1/29/2024 10:23 AM CST -----  Regarding: call back  Type: Patient Call Back    Who called: Pt    What is the request in detail:pt requesting call back regarding the results of a recent biopsy    Can the clinic reply by [facility]?no    Would the patient rather a call back or a response via [facility]? call    Best call back number: [phone]     Additional Information:

## 2024-02-05 ENCOUNTER — TELEPHONE (OUTPATIENT)
Dept: OBSTETRICS AND GYNECOLOGY | Facility: CLINIC | Age: 34
End: 2024-02-05
Payer: COMMERCIAL

## 2024-02-09 ENCOUNTER — TELEPHONE (OUTPATIENT)
Dept: RADIOLOGY | Facility: HOSPITAL | Age: 34
End: 2024-02-09
Payer: COMMERCIAL

## 2024-02-09 ENCOUNTER — LAB VISIT (OUTPATIENT)
Dept: LAB | Facility: HOSPITAL | Age: 34
End: 2024-02-09
Attending: STUDENT IN AN ORGANIZED HEALTH CARE EDUCATION/TRAINING PROGRAM
Payer: COMMERCIAL

## 2024-02-09 ENCOUNTER — OFFICE VISIT (OUTPATIENT)
Dept: PRIMARY CARE CLINIC | Facility: CLINIC | Age: 34
End: 2024-02-09
Payer: COMMERCIAL

## 2024-02-09 ENCOUNTER — TELEPHONE (OUTPATIENT)
Dept: OBSTETRICS AND GYNECOLOGY | Facility: CLINIC | Age: 34
End: 2024-02-09
Payer: COMMERCIAL

## 2024-02-09 VITALS
SYSTOLIC BLOOD PRESSURE: 110 MMHG | HEIGHT: 67 IN | OXYGEN SATURATION: 97 % | BODY MASS INDEX: 24.92 KG/M2 | TEMPERATURE: 99 F | WEIGHT: 158.75 LBS | DIASTOLIC BLOOD PRESSURE: 62 MMHG | HEART RATE: 75 BPM

## 2024-02-09 DIAGNOSIS — N64.4 BREAST PAIN: Primary | ICD-10-CM

## 2024-02-09 DIAGNOSIS — R79.89 HIGH SERUM VITAMIN B12: ICD-10-CM

## 2024-02-09 LAB — VIT B12 SERPL-MCNC: 860 PG/ML (ref 210–950)

## 2024-02-09 PROCEDURE — 99214 OFFICE O/P EST MOD 30 MIN: CPT | Mod: S$GLB,,, | Performed by: STUDENT IN AN ORGANIZED HEALTH CARE EDUCATION/TRAINING PROGRAM

## 2024-02-09 PROCEDURE — 99999 PR PBB SHADOW E&M-EST. PATIENT-LVL III: CPT | Mod: PBBFAC,,, | Performed by: STUDENT IN AN ORGANIZED HEALTH CARE EDUCATION/TRAINING PROGRAM

## 2024-02-09 PROCEDURE — 99213 OFFICE O/P EST LOW 20 MIN: CPT | Mod: PBBFAC,PN | Performed by: STUDENT IN AN ORGANIZED HEALTH CARE EDUCATION/TRAINING PROGRAM

## 2024-02-09 PROCEDURE — 36415 COLL VENOUS BLD VENIPUNCTURE: CPT | Mod: PN | Performed by: STUDENT IN AN ORGANIZED HEALTH CARE EDUCATION/TRAINING PROGRAM

## 2024-02-09 PROCEDURE — 82607 VITAMIN B-12: CPT | Performed by: STUDENT IN AN ORGANIZED HEALTH CARE EDUCATION/TRAINING PROGRAM

## 2024-02-09 NOTE — PROGRESS NOTES
"  Primary Care  Office Visit - In Person  2/9/2024      HPI    Patient is a 33 y.o.   Unique Cody  has a past medical history of Kidney stone.    Patient presents with pain in breast b/l for the past one month   Seems to be worse on left   Worsens with activity like running   No skin changes   Initially suspected could be related to menstrual changes but it has persisted   No nipple discharge   No family hx of breast cancer   Reports no caffeine use   Last period January 11     She is requesting labs for previously elevated B12   She reports no supplementation Since August 2023      Active Medications:  Current Outpatient Medications   Medication Instructions    acetaminophen (TYLENOL) 650 mg, Oral, Every 8 hours    ibuprofen (ADVIL,MOTRIN) 800 mg, Oral, 3 times daily, As needed for pelvic pain       Vitals:    02/09/24 1040   BP: 110/62   BP Location: Right arm   Pulse: 75   Temp: 99.2 °F (37.3 °C)   SpO2: 97%   Weight: 72 kg (158 lb 11.7 oz)   Height: 5' 7" (1.702 m)       Physical Exam  Chest:   Breasts:     Right: Tenderness present. No mass, nipple discharge or skin change.      Left: Tenderness present. No mass, nipple discharge or skin change.   Lymphadenopathy:      Upper Body:      Right upper body: No axillary adenopathy.      Left upper body: No axillary adenopathy.          Assessment and Plan     1. Breast pain  -     US Breast Bilateral Limited; Future; Expected date: 02/09/2024  -     Cancel: Mammo Digital Screening Bilat; Future; Expected date: 02/09/2024  -     Mammo Digital Diagnostic Bilat; Future; Expected date: 02/09/2024    2. High serum vitamin B12  -     VITAMIN B12; Future; Expected date: 02/09/2024        Previous labs, records, and notes reviewed and considered for their impact on clinical decision making today.                Upcoming Scheduled Appointments and Follow Up:    Future Appointments   Date Time Provider Department Center   2/9/2024 11:00 AM LAB, [place] [facility] [place] [facility]       Follow Up [facility] Care (with who? when?): Follow up in about 6 months (around 8/9/2024).      Extended Emergency Contact Information  Primary Emergency Contact: Noreen Mario   [facility]  Home Phone: [phone]  Mobile Phone: [phone]  Relation: Mother      Michael Garcia MD   Internal Medicine  2/9/2024 - 10:45 AM    I spent a total of 40 minutes on the day of the visit.This includes face to face time and non-face to face time preparing to see the patient (eg, review of tests), obtaining and/or reviewing separately obtained history, documenting clinical information in the electronic or other health record, independently interpreting results and communicating results to the patient/family/caregiver, or care coordinator.    While patients have the right to access their medical record, it is essential to recognize that progress notes primarily serve as a means of communication among healthcare professionals.

## 2024-02-09 NOTE — TELEPHONE ENCOUNTER
----- Message from Suri Valdez sent at 2/9/2024 12:13 PM CST -----  Who Called:KATRINA JOHNSON [[id number]]              Who Left Message for Patient: Not sure              Does the patient know what this is regarding? Apt              Best Call Back Number:[phone]              Additional Information: Pt states she missed a call, and just trying to follow up

## 2024-02-09 NOTE — TELEPHONE ENCOUNTER
----- Message from Sadaf Persaud sent at 2/9/2024 11:03 AM CST -----  Dr. Michael Garcia has put in a referral for a Diagnostic Mammogram/US Breast Bilateral Limited. Please assist in scheduling.    Breast pain [N64.4]    Thanks

## 2024-02-19 ENCOUNTER — HOSPITAL ENCOUNTER (OUTPATIENT)
Dept: RADIOLOGY | Facility: HOSPITAL | Age: 34
Discharge: HOME | End: 2024-02-19
Attending: STUDENT IN AN ORGANIZED HEALTH CARE EDUCATION/TRAINING PROGRAM
Payer: COMMERCIAL

## 2024-02-19 VITALS — WEIGHT: 172 LBS | HEIGHT: 67 IN | BODY MASS INDEX: 27 KG/M2

## 2024-02-19 DIAGNOSIS — N64.4 BREAST PAIN: ICD-10-CM

## 2024-02-19 PROCEDURE — 76642 ULTRASOUND BREAST LIMITED: CPT | Mod: 26,50,, | Performed by: RADIOLOGY

## 2024-02-19 PROCEDURE — 76642 ULTRASOUND BREAST LIMITED: CPT | Mod: TC,50

## 2024-02-19 PROCEDURE — 77066 DX MAMMO INCL CAD BI: CPT | Mod: 26,,, | Performed by: RADIOLOGY

## 2024-02-19 PROCEDURE — 77066 DX MAMMO INCL CAD BI: CPT | Mod: TC

## 2024-03-18 ENCOUNTER — OFFICE VISIT (OUTPATIENT)
Dept: PRIMARY CARE CLINIC | Facility: CLINIC | Age: 34
End: 2024-03-18
Payer: COMMERCIAL

## 2024-03-18 VITALS
HEART RATE: 75 BPM | BODY MASS INDEX: 28 KG/M2 | HEIGHT: 67 IN | SYSTOLIC BLOOD PRESSURE: 116 MMHG | TEMPERATURE: 98 F | DIASTOLIC BLOOD PRESSURE: 74 MMHG | OXYGEN SATURATION: 98 % | WEIGHT: 178.38 LBS | RESPIRATION RATE: 20 BRPM

## 2024-03-18 DIAGNOSIS — N30.00 ACUTE CYSTITIS WITHOUT HEMATURIA: Primary | ICD-10-CM

## 2024-03-18 PROCEDURE — 99999 PR PBB SHADOW E&M-EST. PATIENT-LVL III: CPT | Mod: PBBFAC,,, | Performed by: STUDENT IN AN ORGANIZED HEALTH CARE EDUCATION/TRAINING PROGRAM

## 2024-03-18 PROCEDURE — 3078F DIAST BP <80 MM HG: CPT | Mod: CPTII,S$GLB,, | Performed by: STUDENT IN AN ORGANIZED HEALTH CARE EDUCATION/TRAINING PROGRAM

## 2024-03-18 PROCEDURE — 87491 CHLMYD TRACH DNA AMP PROBE: CPT | Performed by: STUDENT IN AN ORGANIZED HEALTH CARE EDUCATION/TRAINING PROGRAM

## 2024-03-18 PROCEDURE — 81001 URINALYSIS AUTO W/SCOPE: CPT | Performed by: STUDENT IN AN ORGANIZED HEALTH CARE EDUCATION/TRAINING PROGRAM

## 2024-03-18 PROCEDURE — 3074F SYST BP LT 130 MM HG: CPT | Mod: CPTII,S$GLB,, | Performed by: STUDENT IN AN ORGANIZED HEALTH CARE EDUCATION/TRAINING PROGRAM

## 2024-03-18 PROCEDURE — 99214 OFFICE O/P EST MOD 30 MIN: CPT | Mod: S$GLB,,, | Performed by: STUDENT IN AN ORGANIZED HEALTH CARE EDUCATION/TRAINING PROGRAM

## 2024-03-18 PROCEDURE — 3008F BODY MASS INDEX DOCD: CPT | Mod: CPTII,S$GLB,, | Performed by: STUDENT IN AN ORGANIZED HEALTH CARE EDUCATION/TRAINING PROGRAM

## 2024-03-18 PROCEDURE — 1159F MED LIST DOCD IN RCRD: CPT | Mod: CPTII,S$GLB,, | Performed by: STUDENT IN AN ORGANIZED HEALTH CARE EDUCATION/TRAINING PROGRAM

## 2024-03-18 RX ORDER — NITROFURANTOIN 25; 75 MG/1; MG/1
100 CAPSULE ORAL 2 TIMES DAILY
Qty: 10 CAPSULE | Refills: 0 | Status: SHIPPED | OUTPATIENT
Start: 2024-03-18 | End: 2024-03-19 | Stop reason: ALTCHOICE

## 2024-03-18 NOTE — PROGRESS NOTES
"Primary Care  Office Visit - In Person  3/18/2024      HPI    Patient is a 33 y.o.   Unique Cody  has a past medical history of Kidney stone.    Patient presents with pain with urination and abdominal discomfort for the past 4-5 days   No fever or chills       Active Medications:  Current Outpatient Medications   Medication Instructions    acetaminophen (TYLENOL) 650 mg, Oral, Every 8 hours    ibuprofen (ADVIL,MOTRIN) 800 mg, Oral, 3 times daily, As needed for pelvic pain    nitrofurantoin, macrocrystal-monohydrate, (MACROBID) 100 MG capsule 100 mg, Oral, 2 times daily       Vitals:    03/18/24 1403   BP: 116/74   BP Location: Right arm   Patient Position: Sitting   Pulse: 75   Resp: 20   Temp: 97.9 °F (36.6 °C)   TempSrc: Oral   SpO2: 98%   Weight: 80.9 kg (178 lb 5.6 oz)   Height: 5' 7" (1.702 m)       Physical Exam  Vitals reviewed.   Constitutional:       General: She is not in acute distress.     Appearance: She is not ill-appearing.   Abdominal:      General: Abdomen is flat. Bowel sounds are normal. There is no distension.      Palpations: Abdomen is soft.      Tenderness: There is abdominal tenderness. There is right CVA tenderness.          Assessment and Plan     Patient afebrile and non-toxic appearing   Consider CT if UA neg    1. Acute cystitis without hematuria  -     nitrofurantoin, macrocrystal-monohydrate, (MACROBID) 100 MG capsule; Take 1 capsule (100 mg total) by mouth 2 (two) times daily. for 5 days  Dispense: 10 capsule; Refill: 0  -     Urinalysis, Reflex to Urine Culture Urine, Clean Catch  -     C. trachomatis/N. gonorrhoeae by AMP DNA [facility]; Urine        Previous labs, records, and notes reviewed and considered for their impact on clinical decision making today.                Upcoming Scheduled Appointments and Follow Up:    Future Appointments   Date Time Provider Department Center   8/9/2024  1:30 PM Michael Garcia MD [facility]       Follow Up [facility] (with " who? when?): No follow-ups on file.      Extended Emergency Contact Information  Primary Emergency Contact: Noreen Mario   [place]  Home Phone: [phone]  Mobile Phone: [phone]  Relation: Mother      Michael Garcia MD   Internal Medicine  3/18/2024 - 2:03 PM    15 minutes  This includes face to face time and non-face to face time preparing to see the patient (eg, review of tests), obtaining and/or reviewing separately obtained history, documenting clinical information in the electronic or other health record, independently interpreting results and communicating results to the patient/family/caregiver, or care coordinator.    While patients have the right to access their medical record, it is essential to recognize that progress notes primarily serve as a means of communication among healthcare professionals.

## 2024-03-19 DIAGNOSIS — R10.32 LEFT LOWER QUADRANT PAIN: Primary | ICD-10-CM

## 2024-03-19 LAB
BACTERIA #/AREA URNS AUTO: NORMAL /HPF
BILIRUB UR QL STRIP: NEGATIVE
C TRACH DNA SPEC QL NAA+PROBE: NOT DETECTED
CLARITY UR REFRACT.AUTO: CLEAR
COLOR UR AUTO: ABNORMAL
GLUCOSE UR QL STRIP: NEGATIVE
HGB UR QL STRIP: ABNORMAL
KETONES UR QL STRIP: NEGATIVE
LEUKOCYTE ESTERASE UR QL STRIP: NEGATIVE
MICROSCOPIC COMMENT: NORMAL
N GONORRHOEA DNA SPEC QL NAA+PROBE: NOT DETECTED
NITRITE UR QL STRIP: NEGATIVE
PH UR STRIP: 7 [PH] (ref 5–8)
PROT UR QL STRIP: NEGATIVE
RBC #/AREA URNS AUTO: 3 /HPF (ref 0–4)
SP GR UR STRIP: 1 (ref 1–1.03)
SQUAMOUS #/AREA URNS AUTO: 1 /HPF
URN SPEC COLLECT METH UR: ABNORMAL
WBC #/AREA URNS AUTO: 1 /HPF (ref 0–5)

## 2024-08-28 ENCOUNTER — TELEPHONE (OUTPATIENT)
Dept: OBSTETRICS AND GYNECOLOGY | Facility: CLINIC | Age: 34
End: 2024-08-28

## 2024-08-28 NOTE — TELEPHONE ENCOUNTER
Pt wanted to be seen for WWE. Told her Dr NAIR isn't performing them, and she doesn't have a PA or NP right now, but will have one in December. Pt said she doesn't want to wait that long. Offered to schedule with other providers but every available date I offered, she wasn't available. Pt said she will schedule on the portal on her own time.

## 2024-08-28 NOTE — TELEPHONE ENCOUNTER
----- Message from Yanci Maldonado sent at 8/28/2024  3:27 PM CDT -----  Type:  Needs Medical Advice/well woman     Who Called: pt    Would the patient rather a call back or a response via [facility]? Call   Best Call Back Number: [phone]  Additional Information: pt requesting to have well woman exam scheduled

## 2024-09-12 ENCOUNTER — OFFICE VISIT (OUTPATIENT)
Dept: PRIMARY CARE CLINIC | Facility: CLINIC | Age: 34
End: 2024-09-12
Payer: COMMERCIAL

## 2024-09-12 ENCOUNTER — LAB VISIT (OUTPATIENT)
Dept: LAB | Facility: HOSPITAL | Age: 34
End: 2024-09-12
Attending: STUDENT IN AN ORGANIZED HEALTH CARE EDUCATION/TRAINING PROGRAM
Payer: COMMERCIAL

## 2024-09-12 VITALS
BODY MASS INDEX: 27.89 KG/M2 | WEIGHT: 177.69 LBS | HEIGHT: 67 IN | DIASTOLIC BLOOD PRESSURE: 78 MMHG | SYSTOLIC BLOOD PRESSURE: 123 MMHG | TEMPERATURE: 99 F | HEART RATE: 82 BPM

## 2024-09-12 DIAGNOSIS — Z13.1 SCREENING FOR DIABETES MELLITUS: ICD-10-CM

## 2024-09-12 DIAGNOSIS — Z00.00 ENCOUNTER FOR PREVENTATIVE ADULT HEALTH CARE EXAMINATION: Primary | ICD-10-CM

## 2024-09-12 DIAGNOSIS — N30.00 ACUTE CYSTITIS WITHOUT HEMATURIA: ICD-10-CM

## 2024-09-12 DIAGNOSIS — Z11.3 ROUTINE SCREENING FOR STI (SEXUALLY TRANSMITTED INFECTION): ICD-10-CM

## 2024-09-12 DIAGNOSIS — M21.612 BUNION OF GREAT TOE OF LEFT FOOT: ICD-10-CM

## 2024-09-12 DIAGNOSIS — Z13.220 SCREENING FOR HYPERLIPIDEMIA: ICD-10-CM

## 2024-09-12 DIAGNOSIS — M79.675 GREAT TOE PAIN, LEFT: ICD-10-CM

## 2024-09-12 DIAGNOSIS — Z00.00 ENCOUNTER FOR PREVENTATIVE ADULT HEALTH CARE EXAMINATION: ICD-10-CM

## 2024-09-12 LAB
ALBUMIN SERPL BCP-MCNC: 3.9 G/DL (ref 3.5–5.2)
ALP SERPL-CCNC: 58 U/L (ref 55–135)
ALT SERPL W/O P-5'-P-CCNC: 19 U/L (ref 10–44)
AMORPH CRY UR QL COMP ASSIST: ABNORMAL
ANION GAP SERPL CALC-SCNC: 8 MMOL/L (ref 8–16)
AST SERPL-CCNC: 21 U/L (ref 10–40)
BACTERIA #/AREA URNS AUTO: ABNORMAL /HPF
BILIRUB SERPL-MCNC: 0.3 MG/DL (ref 0.1–1)
BILIRUB UR QL STRIP: NEGATIVE
BUN SERPL-MCNC: 14 MG/DL (ref 6–20)
CALCIUM SERPL-MCNC: 9 MG/DL (ref 8.7–10.5)
CHLORIDE SERPL-SCNC: 109 MMOL/L (ref 95–110)
CHOLEST SERPL-MCNC: 193 MG/DL (ref 120–199)
CHOLEST/HDLC SERPL: 3.1 {RATIO} (ref 2–5)
CLARITY UR REFRACT.AUTO: ABNORMAL
CO2 SERPL-SCNC: 23 MMOL/L (ref 23–29)
COLOR UR AUTO: YELLOW
CREAT SERPL-MCNC: 1.1 MG/DL (ref 0.5–1.4)
ERYTHROCYTE [DISTWIDTH] IN BLOOD BY AUTOMATED COUNT: 13.5 % (ref 11.5–14.5)
EST. GFR  (NO RACE VARIABLE): >60 ML/MIN/1.73 M^2
ESTIMATED AVG GLUCOSE: 105 MG/DL (ref 68–131)
GLUCOSE SERPL-MCNC: 85 MG/DL (ref 70–110)
GLUCOSE UR QL STRIP: NEGATIVE
HBA1C MFR BLD: 5.3 % (ref 4–5.6)
HCT VFR BLD AUTO: 37.6 % (ref 37–48.5)
HCV AB SERPL QL IA: NORMAL
HDLC SERPL-MCNC: 62 MG/DL (ref 40–75)
HDLC SERPL: 32.1 % (ref 20–50)
HGB BLD-MCNC: 11.5 G/DL (ref 12–16)
HGB UR QL STRIP: ABNORMAL
HIV 1+2 AB+HIV1 P24 AG SERPL QL IA: NORMAL
KETONES UR QL STRIP: NEGATIVE
LDLC SERPL CALC-MCNC: 108 MG/DL (ref 63–159)
LEUKOCYTE ESTERASE UR QL STRIP: ABNORMAL
MCH RBC QN AUTO: 26.9 PG (ref 27–31)
MCHC RBC AUTO-ENTMCNC: 30.6 G/DL (ref 32–36)
MCV RBC AUTO: 88 FL (ref 82–98)
MICROSCOPIC COMMENT: ABNORMAL
NITRITE UR QL STRIP: NEGATIVE
NONHDLC SERPL-MCNC: 131 MG/DL
PH UR STRIP: 6 [PH] (ref 5–8)
PLATELET # BLD AUTO: 336 K/UL (ref 150–450)
PMV BLD AUTO: 10.3 FL (ref 9.2–12.9)
POTASSIUM SERPL-SCNC: 4.3 MMOL/L (ref 3.5–5.1)
PROT SERPL-MCNC: 7.2 G/DL (ref 6–8.4)
PROT UR QL STRIP: ABNORMAL
RBC # BLD AUTO: 4.27 M/UL (ref 4–5.4)
RBC #/AREA URNS AUTO: 1 /HPF (ref 0–4)
SODIUM SERPL-SCNC: 140 MMOL/L (ref 136–145)
SP GR UR STRIP: >1.03 (ref 1–1.03)
SQUAMOUS #/AREA URNS AUTO: 7 /HPF
TREPONEMA PALLIDUM IGG+IGM AB [PRESENCE] IN SERUM OR PLASMA BY IMMUNOASSAY: NONREACTIVE
TRIGL SERPL-MCNC: 115 MG/DL (ref 30–150)
TSH SERPL DL<=0.005 MIU/L-ACNC: 1.07 UIU/ML (ref 0.4–4)
URATE SERPL-MCNC: 4.3 MG/DL (ref 2.4–5.7)
URN SPEC COLLECT METH UR: ABNORMAL
WBC # BLD AUTO: 6.26 K/UL (ref 3.9–12.7)
WBC #/AREA URNS AUTO: 8 /HPF (ref 0–5)

## 2024-09-12 PROCEDURE — 83036 HEMOGLOBIN GLYCOSYLATED A1C: CPT | Performed by: STUDENT IN AN ORGANIZED HEALTH CARE EDUCATION/TRAINING PROGRAM

## 2024-09-12 PROCEDURE — 86593 SYPHILIS TEST NON-TREP QUANT: CPT | Performed by: STUDENT IN AN ORGANIZED HEALTH CARE EDUCATION/TRAINING PROGRAM

## 2024-09-12 PROCEDURE — 84443 ASSAY THYROID STIM HORMONE: CPT | Performed by: STUDENT IN AN ORGANIZED HEALTH CARE EDUCATION/TRAINING PROGRAM

## 2024-09-12 PROCEDURE — 81001 URINALYSIS AUTO W/SCOPE: CPT | Performed by: STUDENT IN AN ORGANIZED HEALTH CARE EDUCATION/TRAINING PROGRAM

## 2024-09-12 PROCEDURE — 86803 HEPATITIS C AB TEST: CPT | Performed by: STUDENT IN AN ORGANIZED HEALTH CARE EDUCATION/TRAINING PROGRAM

## 2024-09-12 PROCEDURE — 99999 PR PBB SHADOW E&M-EST. PATIENT-LVL III: CPT | Mod: PBBFAC,,, | Performed by: STUDENT IN AN ORGANIZED HEALTH CARE EDUCATION/TRAINING PROGRAM

## 2024-09-12 PROCEDURE — 85027 COMPLETE CBC AUTOMATED: CPT | Performed by: STUDENT IN AN ORGANIZED HEALTH CARE EDUCATION/TRAINING PROGRAM

## 2024-09-12 PROCEDURE — 80053 COMPREHEN METABOLIC PANEL: CPT | Performed by: STUDENT IN AN ORGANIZED HEALTH CARE EDUCATION/TRAINING PROGRAM

## 2024-09-12 PROCEDURE — 87491 CHLMYD TRACH DNA AMP PROBE: CPT | Performed by: STUDENT IN AN ORGANIZED HEALTH CARE EDUCATION/TRAINING PROGRAM

## 2024-09-12 PROCEDURE — 87389 HIV-1 AG W/HIV-1&-2 AB AG IA: CPT | Performed by: STUDENT IN AN ORGANIZED HEALTH CARE EDUCATION/TRAINING PROGRAM

## 2024-09-12 PROCEDURE — 87591 N.GONORRHOEAE DNA AMP PROB: CPT | Performed by: STUDENT IN AN ORGANIZED HEALTH CARE EDUCATION/TRAINING PROGRAM

## 2024-09-12 PROCEDURE — 80061 LIPID PANEL: CPT | Performed by: STUDENT IN AN ORGANIZED HEALTH CARE EDUCATION/TRAINING PROGRAM

## 2024-09-12 PROCEDURE — 84550 ASSAY OF BLOOD/URIC ACID: CPT | Performed by: STUDENT IN AN ORGANIZED HEALTH CARE EDUCATION/TRAINING PROGRAM

## 2024-09-12 NOTE — PROGRESS NOTES
Primary Care  Annual Office Visit - In Person  9/12/2024          Patient is a 33 y.o.   Unique Cody  has a past medical history of Kidney stone.    Patient reports pain in left great toe when walking or exercising   She denies any swelling   Pain typically occurs with activity     Patient also expressed concern for UTI due to dysuria     Active Medications  Current Outpatient Medications   Medication Instructions    acetaminophen (TYLENOL) 650 mg, Oral, Every 8 hours    ibuprofen (ADVIL,MOTRIN) 800 mg, Oral, 3 times daily, As needed for pelvic pain       Annual Review of Preventative Care    Health Maintenance Due   Topic Date Due    Influenza Vaccine (1) Never done    COVID-19 Vaccine (1 - 2023-24 season) Never done     Diabetes Screening:    Lab Results   Component Value Date    HGBA1C 5.4 04/14/2023    HGBA1C 5.5 10/01/2020    HGBA1C 5.3 10/03/2019     BP Readings from Last 3 Encounters:   09/12/24 123/78   03/18/24 116/74   02/09/24 110/62     Wt Readings from Last 3 Encounters:   09/12/24 1447 80.6 kg (177 lb 11.1 oz)   03/18/24 1403 80.9 kg (178 lb 5.6 oz)   02/19/24 1340 78 kg (172 lb)       Pap: Next Due 2027          Physical Exam  Vitals reviewed.   Constitutional:       General: She is not in acute distress.  Eyes:      Pupils: Pupils are equal, round, and reactive to light.   Cardiovascular:      Rate and Rhythm: Normal rate and regular rhythm.      Pulses: Normal pulses.      Heart sounds: Normal heart sounds.   Pulmonary:      Effort: Pulmonary effort is normal.      Breath sounds: Normal breath sounds.   Abdominal:      General: Abdomen is flat. Bowel sounds are normal.      Palpations: Abdomen is soft.   Musculoskeletal:      Right lower leg: No edema.      Left lower leg: No edema.                 Assessment and Plan     1. Encounter for preventative adult health care examination  -     CBC Without Differential; Future; Expected date: 09/12/2024  -     Comprehensive Metabolic Panel; Future;  Expected date: 09/12/2024  -     TSH; Future; Expected date: 09/12/2024  -     Ambulatory referral/consult to Ophthalmology; Future; Expected date: 09/19/2024    2. Screening for diabetes mellitus  -     Hemoglobin A1C; Future; Expected date: 09/12/2024    3. Screening for hyperlipidemia  -     Lipid Panel; Future; Expected date: 09/12/2024    4. Routine screening for STI (sexually transmitted infection)  -     Hepatitis C Antibody; Future; Expected date: 09/12/2024  -     HIV 1/2 Ag/Ab (4th Gen); Future; Expected date: 09/12/2024  -     C. trachomatis/N. gonorrhoeae by AMP DNA [facility]; Urine  -     Treponema Pallidium Antibodies IgG, IgM; Future; Expected date: 09/12/2024    5. Great toe pain, left  Comments:  Low suspicion gout  Orders:  -     URIC ACID; Future; Expected date: 09/12/2024    6. Bunion of great toe of left foot  -     Ambulatory referral/consult to Podiatry; Future; Expected date: 09/19/2024    7. Acute cystitis without hematuria  -     Urinalysis                                     Upcoming Scheduled Appointments and Follow Up:    Future Appointments   Date Time Provider Department Center   9/12/2024  3:10 PM [facility] [place]   10/21/2024  2:00 PM Michael Garcia MD [facility]       Follow Up [facility] Care (with who? when?): No follow-ups on file.        Extended Emergency Contact Information  Primary Emergency Contact: Noreen Mario   [facility] [place]  Home Phone: [phone]  Mobile Phone: [phone]  Relation: Mother      Michael Garcia MD   Internal Medicine  9/12/2024 - 2:52 PM    I spent a total of 30 minutes on the day of the visit.This includes face to face time and non-face to face time preparing to see the patient (eg, review of tests), obtaining and/or reviewing separately obtained history, documenting clinical information in the electronic or other health record, independently interpreting results and communicating results to the  patient/family/caregiver, or care coordinator.    While patients have the right to access their medical record, it is essential to recognize that progress notes primarily serve as a means of communication among healthcare professionals.

## 2024-09-13 ENCOUNTER — PATIENT MESSAGE (OUTPATIENT)
Dept: PRIMARY CARE CLINIC | Facility: CLINIC | Age: 34
End: 2024-09-13
Payer: COMMERCIAL

## 2024-09-13 DIAGNOSIS — D64.9 NORMOCYTIC ANEMIA: Primary | ICD-10-CM

## 2024-09-13 LAB
C TRACH DNA SPEC QL NAA+PROBE: NOT DETECTED
N GONORRHOEA DNA SPEC QL NAA+PROBE: NOT DETECTED

## 2024-09-13 RX ORDER — NITROFURANTOIN 25; 75 MG/1; MG/1
100 CAPSULE ORAL 2 TIMES DAILY
Qty: 10 CAPSULE | Refills: 0 | Status: SHIPPED | OUTPATIENT
Start: 2024-09-13 | End: 2024-09-18

## 2024-09-24 ENCOUNTER — LAB VISIT (OUTPATIENT)
Dept: LAB | Facility: HOSPITAL | Age: 34
End: 2024-09-24
Attending: STUDENT IN AN ORGANIZED HEALTH CARE EDUCATION/TRAINING PROGRAM
Payer: COMMERCIAL

## 2024-09-24 ENCOUNTER — TELEPHONE (OUTPATIENT)
Dept: PODIATRY | Facility: CLINIC | Age: 34
End: 2024-09-24
Payer: COMMERCIAL

## 2024-09-24 DIAGNOSIS — D64.9 NORMOCYTIC ANEMIA: ICD-10-CM

## 2024-09-24 LAB
FERRITIN SERPL-MCNC: 9 NG/ML (ref 20–300)
FOLATE SERPL-MCNC: 7.6 NG/ML (ref 4–24)
IRON SERPL-MCNC: 45 UG/DL (ref 30–160)
SATURATED IRON: 11 % (ref 20–50)
TOTAL IRON BINDING CAPACITY: 417 UG/DL (ref 250–450)
TRANSFERRIN SERPL-MCNC: 282 MG/DL (ref 200–375)
VIT B12 SERPL-MCNC: 701 PG/ML (ref 210–950)

## 2024-09-24 PROCEDURE — 82728 ASSAY OF FERRITIN: CPT | Performed by: STUDENT IN AN ORGANIZED HEALTH CARE EDUCATION/TRAINING PROGRAM

## 2024-09-24 PROCEDURE — 82607 VITAMIN B-12: CPT | Performed by: STUDENT IN AN ORGANIZED HEALTH CARE EDUCATION/TRAINING PROGRAM

## 2024-09-24 PROCEDURE — 83540 ASSAY OF IRON: CPT | Performed by: STUDENT IN AN ORGANIZED HEALTH CARE EDUCATION/TRAINING PROGRAM

## 2024-09-24 PROCEDURE — 36415 COLL VENOUS BLD VENIPUNCTURE: CPT | Mod: PN | Performed by: STUDENT IN AN ORGANIZED HEALTH CARE EDUCATION/TRAINING PROGRAM

## 2024-09-24 PROCEDURE — 82746 ASSAY OF FOLIC ACID SERUM: CPT | Performed by: STUDENT IN AN ORGANIZED HEALTH CARE EDUCATION/TRAINING PROGRAM

## 2024-09-25 RX ORDER — FERROUS SULFATE 325(65) MG
325 TABLET, DELAYED RELEASE (ENTERIC COATED) ORAL
Qty: 45 TABLET | Refills: 1 | Status: SHIPPED | OUTPATIENT
Start: 2024-09-25 | End: 2025-03-24

## 2024-11-07 ENCOUNTER — HOSPITAL ENCOUNTER (EMERGENCY)
Facility: HOSPITAL | Age: 34
Discharge: HOME | End: 2024-11-08
Attending: STUDENT IN AN ORGANIZED HEALTH CARE EDUCATION/TRAINING PROGRAM
Payer: COMMERCIAL

## 2024-11-07 DIAGNOSIS — R07.9 CHEST PAIN: ICD-10-CM

## 2024-11-07 PROCEDURE — 84484 ASSAY OF TROPONIN QUANT: CPT | Mod: ER | Performed by: STUDENT IN AN ORGANIZED HEALTH CARE EDUCATION/TRAINING PROGRAM

## 2024-11-07 PROCEDURE — 25000003 PHARM REV CODE 250: Mod: ER | Performed by: STUDENT IN AN ORGANIZED HEALTH CARE EDUCATION/TRAINING PROGRAM

## 2024-11-07 PROCEDURE — 93010 ELECTROCARDIOGRAM REPORT: CPT | Mod: ,,, | Performed by: INTERNAL MEDICINE

## 2024-11-07 PROCEDURE — 80053 COMPREHEN METABOLIC PANEL: CPT | Mod: ER | Performed by: STUDENT IN AN ORGANIZED HEALTH CARE EDUCATION/TRAINING PROGRAM

## 2024-11-07 PROCEDURE — 85025 COMPLETE CBC W/AUTO DIFF WBC: CPT | Mod: ER | Performed by: STUDENT IN AN ORGANIZED HEALTH CARE EDUCATION/TRAINING PROGRAM

## 2024-11-07 PROCEDURE — 99900035 HC TECH TIME PER 15 MIN (STAT): Mod: ER

## 2024-11-07 PROCEDURE — 99285 EMERGENCY DEPT VISIT HI MDM: CPT | Mod: 25,ER

## 2024-11-07 PROCEDURE — 85379 FIBRIN DEGRADATION QUANT: CPT | Mod: ER | Performed by: STUDENT IN AN ORGANIZED HEALTH CARE EDUCATION/TRAINING PROGRAM

## 2024-11-07 PROCEDURE — 93005 ELECTROCARDIOGRAM TRACING: CPT | Mod: ER

## 2024-11-07 RX ORDER — CALCIUM CARBONATE 200(500)MG
1000 TABLET,CHEWABLE ORAL
Status: COMPLETED | OUTPATIENT
Start: 2024-11-07 | End: 2024-11-07

## 2024-11-07 RX ORDER — FAMOTIDINE 20 MG/1
40 TABLET, FILM COATED ORAL
Status: COMPLETED | OUTPATIENT
Start: 2024-11-07 | End: 2024-11-07

## 2024-11-07 RX ORDER — ACETAMINOPHEN 500 MG
1000 TABLET ORAL
Status: COMPLETED | OUTPATIENT
Start: 2024-11-07 | End: 2024-11-07

## 2024-11-07 RX ADMIN — ACETAMINOPHEN 1000 MG: 500 TABLET ORAL at 11:11

## 2024-11-07 RX ADMIN — CALCIUM CARBONATE (ANTACID) CHEW TAB 500 MG 1000 MG: 500 CHEW TAB at 11:11

## 2024-11-07 RX ADMIN — FAMOTIDINE 40 MG: 20 TABLET ORAL at 11:11

## 2024-11-07 NOTE — Clinical Note
"Unique"Daniela Cody was seen and treated in our emergency department on 11/7/2024.  She may return to work on 11/09/2024.       If you have any questions or concerns, please don't hesitate to call.      Jeremias Hagen MD"

## 2024-11-07 NOTE — Clinical Note
"Unique"Daniela Cody was seen and treated in our emergency department on 11/7/2024.  She may return to work on 11/09/2024.       If you have any questions or concerns, please don't hesitate to call.      Krupa Song, MSN, CAPA, CCHP-RN, CCRN, MELODIE, CPAN, RN    "

## 2024-11-07 NOTE — Clinical Note
"Unique"Daniela Cody was seen and treated in our emergency department on 11/7/2024.  She may return to work on 11/09/2024.       If you have any questions or concerns, please don't hesitate to call.      Krupa Song, MSN, CAPA, CCHP-RN, CCRN, MELODIE, CPAN, MD    "

## 2024-11-07 NOTE — Clinical Note
"Unique"Daneila Cody was seen and treated in our emergency department on 11/7/2024.  She may return to work on 11/09/2024.       If you have any questions or concerns, please don't hesitate to call.      Krupa Song, MSN, CAPA, CCHP-RN, CCRN, MELODIE, CPAN, RN    "

## 2024-11-08 VITALS
HEIGHT: 67 IN | HEART RATE: 64 BPM | DIASTOLIC BLOOD PRESSURE: 68 MMHG | BODY MASS INDEX: 26.53 KG/M2 | RESPIRATION RATE: 20 BRPM | TEMPERATURE: 98 F | WEIGHT: 169 LBS | SYSTOLIC BLOOD PRESSURE: 113 MMHG | OXYGEN SATURATION: 98 %

## 2024-11-08 LAB
ALBUMIN SERPL BCP-MCNC: 4.1 G/DL (ref 3.5–5.2)
ALP SERPL-CCNC: 80 U/L (ref 38–126)
ALT SERPL W/O P-5'-P-CCNC: 18 U/L (ref 10–44)
ANION GAP SERPL CALC-SCNC: 5 MMOL/L (ref 8–16)
AST SERPL-CCNC: 24 U/L (ref 15–46)
B-HCG UR QL: NEGATIVE
BASOPHILS # BLD AUTO: 0.04 K/UL (ref 0–0.2)
BASOPHILS NFR BLD: 0.6 % (ref 0–1.9)
BILIRUB SERPL-MCNC: 0.1 MG/DL (ref 0.1–1)
CALCIUM SERPL-MCNC: 8.2 MG/DL (ref 8.7–10.5)
CHLORIDE SERPL-SCNC: 106 MMOL/L (ref 95–110)
CO2 SERPL-SCNC: 25 MMOL/L (ref 23–29)
CREAT SERPL-MCNC: 0.78 MG/DL (ref 0.5–1.4)
CTP QC/QA: YES
D DIMER PPP IA.FEU-MCNC: <0.19 MG/L FEU
DIFFERENTIAL METHOD BLD: ABNORMAL
EOSINOPHIL # BLD AUTO: 0.1 K/UL (ref 0–0.5)
EOSINOPHIL NFR BLD: 0.8 % (ref 0–8)
ERYTHROCYTE [DISTWIDTH] IN BLOOD BY AUTOMATED COUNT: 13 % (ref 11.5–14.5)
EST. GFR  (NO RACE VARIABLE): >60 ML/MIN/1.73 M^2
GLUCOSE SERPL-MCNC: 92 MG/DL (ref 70–110)
HCT VFR BLD AUTO: 35.7 % (ref 37–48.5)
HGB BLD-MCNC: 11.7 G/DL (ref 12–16)
IMM GRANULOCYTES # BLD AUTO: 0.08 K/UL (ref 0–0.04)
IMM GRANULOCYTES NFR BLD AUTO: 1.3 % (ref 0–0.5)
LYMPHOCYTES # BLD AUTO: 2.2 K/UL (ref 1–4.8)
LYMPHOCYTES NFR BLD: 35.4 % (ref 18–48)
MCH RBC QN AUTO: 27.9 PG (ref 27–31)
MCHC RBC AUTO-ENTMCNC: 32.8 G/DL (ref 32–36)
MCV RBC AUTO: 85 FL (ref 82–98)
MONOCYTES # BLD AUTO: 0.5 K/UL (ref 0.3–1)
MONOCYTES NFR BLD: 7.8 % (ref 4–15)
NEUTROPHILS # BLD AUTO: 3.4 K/UL (ref 1.8–7.7)
NEUTROPHILS NFR BLD: 54.1 % (ref 38–73)
NRBC BLD-RTO: 0 /100 WBC
PLATELET # BLD AUTO: 317 K/UL (ref 150–450)
PMV BLD AUTO: 9.4 FL (ref 9.2–12.9)
POTASSIUM SERPL-SCNC: 4 MMOL/L (ref 3.5–5.1)
PROT SERPL-MCNC: 6.8 G/DL (ref 6–8.4)
RBC # BLD AUTO: 4.2 M/UL (ref 4–5.4)
SODIUM SERPL-SCNC: 136 MMOL/L (ref 136–145)
TROPONIN I SERPL-MCNC: <0.012 NG/ML (ref 0.01–0.03)
TROPONIN I SERPL-MCNC: <0.012 NG/ML (ref 0.01–0.03)
UUN UR-MCNC: 16 MG/DL (ref 7–17)
WBC # BLD AUTO: 6.28 K/UL (ref 3.9–12.7)

## 2024-11-08 PROCEDURE — 84484 ASSAY OF TROPONIN QUANT: CPT | Mod: ER | Performed by: STUDENT IN AN ORGANIZED HEALTH CARE EDUCATION/TRAINING PROGRAM

## 2024-11-08 PROCEDURE — 96374 THER/PROPH/DIAG INJ IV PUSH: CPT | Mod: ER

## 2024-11-08 PROCEDURE — 63600175 PHARM REV CODE 636 W HCPCS: Mod: ER | Performed by: STUDENT IN AN ORGANIZED HEALTH CARE EDUCATION/TRAINING PROGRAM

## 2024-11-08 PROCEDURE — 81025 URINE PREGNANCY TEST: CPT | Mod: ER | Performed by: STUDENT IN AN ORGANIZED HEALTH CARE EDUCATION/TRAINING PROGRAM

## 2024-11-08 RX ORDER — KETOROLAC TROMETHAMINE 30 MG/ML
30 INJECTION, SOLUTION INTRAMUSCULAR; INTRAVENOUS
Status: COMPLETED | OUTPATIENT
Start: 2024-11-08 | End: 2024-11-08

## 2024-11-08 RX ORDER — NAPROXEN 500 MG/1
500 TABLET ORAL 2 TIMES DAILY
Qty: 10 TABLET | Refills: 0 | Status: SHIPPED | OUTPATIENT
Start: 2024-11-08 | End: 2024-11-08

## 2024-11-08 RX ORDER — FAMOTIDINE 20 MG/1
20 TABLET, FILM COATED ORAL 2 TIMES DAILY
Qty: 10 TABLET | Refills: 0 | Status: SHIPPED | OUTPATIENT
Start: 2024-11-08 | End: 2024-11-13

## 2024-11-08 RX ORDER — FAMOTIDINE 20 MG/1
20 TABLET, FILM COATED ORAL 2 TIMES DAILY
Qty: 10 TABLET | Refills: 0 | Status: SHIPPED | OUTPATIENT
Start: 2024-11-08 | End: 2024-11-08

## 2024-11-08 RX ORDER — NAPROXEN 500 MG/1
500 TABLET ORAL 2 TIMES DAILY
Qty: 10 TABLET | Refills: 0 | Status: SHIPPED | OUTPATIENT
Start: 2024-11-08 | End: 2024-11-13

## 2024-11-08 RX ADMIN — KETOROLAC TROMETHAMINE 30 MG: 30 INJECTION, SOLUTION INTRAMUSCULAR at 02:11

## 2024-11-08 NOTE — ED PROVIDER NOTES
Encounter Date: 11/7/2024       History     Chief Complaint   Patient presents with    Chest Pain     Pt c/o left sided chest pain that started from left shoulder and radiates into the chest that started around 5:30 pm after working out. She states the pain is constant and worse with taking in a deep breathe. Denies any cardiac hx.      HPI  33-year-old female no significant medical history presents brought in by self through triage for chest pain that is left-sided, associated with movements of the chest wall, nonexertional or otherwise positional  Review of patient's allergies indicates:  No Known Allergies  Past Medical History:   Diagnosis Date    Kidney stone      Past Surgical History:   Procedure Laterality Date    DIAGNOSTIC LAPAROSCOPY      KNEE ARTHROPLASTY Right      Family History   Problem Relation Name Age of Onset    Hypertension Mother      Blindness Neg Hx      Amblyopia Neg Hx      Cancer Neg Hx      Cataracts Neg Hx      Diabetes Neg Hx      Glaucoma Neg Hx      Macular degeneration Neg Hx      Retinal detachment Neg Hx      Strabismus Neg Hx      Stroke Neg Hx      Thyroid disease Neg Hx      Breast cancer Neg Hx      Colon cancer Neg Hx      Ovarian cancer Neg Hx       Social History     Tobacco Use    Smoking status: Never     Passive exposure: Never    Smokeless tobacco: Never   Substance Use Topics    Alcohol use: No    Drug use: No   Medical surgical family and social history reviewed  Review of Systems  Complete review of systems was conducted and was negative except as per HPI and as marked  Physical Exam     Initial Vitals [11/07/24 2319]   BP Pulse Resp Temp SpO2   135/68 73 20 98.2 °F (36.8 °C) 100 %      MAP       --         Physical Exam  Physical  General: Awake, alert, no acute distress  Head: Normocephalic, atraumatic  Neck: Trachea midline, full range of motion, no meningismus  ENT: Atraumatic, Airway Patent  Cardio: Regular Rate, Regular Rhythm, Heart Sounds Normal, Capillary  refill normal  Chest: Atraumatic, No respiratory distress no use of accessory muscles to breath, normal rate, sounds even and clear to auscultation  Abdomen: Soft, Nontender, no involuntary guarding, rigidity, or rebound.  Upper Ext: Atraumatic, Inspection normal, no swelling  Lower Ext: Atraumatic, Inspection normal, no swelling  Neuro: No gross cranial nerve abnormality, no lateralization, no gross sensory or motor deficits  ED Course   Procedures  Labs Reviewed   CBC W/ AUTO DIFFERENTIAL - Abnormal       Result Value    WBC 6.28      RBC 4.20      Hemoglobin 11.7 (*)     Hematocrit 35.7 (*)     MCV 85      MCH 27.9      MCHC 32.8      RDW 13.0      Platelets 317      MPV 9.4      Immature Granulocytes 1.3 (*)     Gran # (ANC) 3.4      Immature Grans (Abs) 0.08 (*)     Lymph # 2.2      Mono # 0.5      Eos # 0.1      Baso # 0.04      nRBC 0      Gran % 54.1      Lymph % 35.4      Mono % 7.8      Eosinophil % 0.8      Basophil % 0.6      Differential Method Automated     COMPREHENSIVE METABOLIC PANEL - Abnormal    Sodium 136      Potassium 4.0      Chloride 106      CO2 25      Glucose 92      BUN 16      Creatinine 0.78      Calcium 8.2 (*)     Total Protein 6.8      Albumin 4.1      Total Bilirubin 0.1      Alkaline Phosphatase 80      AST 24      ALT 18      Anion Gap 5 (*)     eGFR >60.0     D DIMER, QUANTITATIVE    D-Dimer <0.19     TROPONIN I    Troponin I <0.012     TROPONIN I    Troponin I <0.012     POCT URINE PREGNANCY    POC Preg Test, Ur Negative       Acceptable Yes          ECG Results              EKG 12-lead (In process)        Collection Time Result Time QRS Duration O[facility] QTC Calculation    11/07/24 23:21:13 11/08/24 07:40:11 86 443                     In process by Interface, Lab In [facility] (11/08/24 07:40:25)                   Narrative:    Test Reason : R07.9,    Vent. Rate : 076 BPM     Atrial Rate : 076 BPM     P-R Int : 156 ms          QRS Dur : 086 ms      QT Int : 394 ms        P-R-T Axes : 049 079 050 degrees     QTc Int : 443 ms    Normal sinus rhythm  Normal ECG  No previous ECGs available    Referred By: [email or web address]   SELF           Confirmed By:                                   Imaging Results              X-Ray Chest 1 View (Final result)  Result time 11/08/24 08:04:25   Procedure changed from X-Ray Chest AP Portable     Final result by Aaron Todd MD (11/08/24 08:04:25)                   Impression:      No acute finding in the chest.      Electronically signed by: Aaron Todd  Date:    11/08/2024  Time:    08:04               Narrative:    EXAMINATION:  XR CHEST 1 VIEW    CLINICAL HISTORY:  Chest pain, unspecifiedchest pain;Chest Pain;    FINDINGS:  Comparison: None available.    Mediastinal silhouette is within normal limits.  The lungs are clear.  No pneumothorax or pleural effusion.  No acute osseous finding.                                       Medications   acetaminophen tablet 1,000 mg (1,000 mg Oral Given 11/7/24 2347)   calcium carbonate 200 mg calcium (500 mg) chewable tablet 1,000 mg (1,000 mg Oral Given 11/7/24 2347)   famotidine tablet 40 mg (40 mg Oral Given 11/7/24 2347)   ketorolac injection 30 mg (30 mg Intravenous Given 11/8/24 0217)     Medical Decision Making              33-year-old female presented for atypical chest pain.  Hemodynamically stable in no acute distress, well-appearing and well-hydrated.  Will evaluate for acute pathology requiring intervention with appropriate studies, treat symptomatically, and reassess.    After consideration of history, exam, and pertinent studies, risk stratification is done as such.    As is standard in the evaluation of chest pain, consideration was given to (but not limited to) EMCs: Aortic dissection, ACS, PE, and pneumothorax.    Pneumothorax excluded on the basis of Xray/imaging.    Presentation is clinically inconsistent with aortic dissection. ADDRS is 0 (no high risk pain features, high risk exam features, or  high risk medical history).  Dimer negative Chest x-ray without mediastinal widening. No indication for further evaluation for aortic dissection (risks associated with further evaluation including but not limited to contrast and radiation outweigh benefits).    Perc Negative. Lowest risk per wells score.  Dimer negative.  No indication for further evaluation for PE, (risks associated with further evaluation including but not limited to contrast and radiation outweigh benefits). Heart score is 3 or less, or 4 or less with no known history of CAD. This has been extensively validated in scientific data to represent 30 risk of MACE at 1% or less, indicating risks associated with further ED/inpatient evaluation outweigh benefits and patient is stable for outpatient discharge with appropriate follow-up. All of this was discussed at length in laypersons terminology in my traditional manner to which patient verbalized understanding and all questions were answered. They are well appearing at time of discharge.                      Clinical Impression:  Final diagnoses:  [R07.9] Chest pain          ED Disposition Condition    Discharge Stable          ED Prescriptions       Medication Sig Dispense Start Date End Date Auth. Provider    naproxen (NAPROSYN) 500 MG tablet  (Status: Discontinued) Take 1 tablet (500 mg total) by mouth 2 (two) times daily. for 5 days 10 tablet 11/8/2024 11/8/2024 Jeremias Hagen MD    famotidine (PEPCID) 20 MG tablet  (Status: Discontinued) Take 1 tablet (20 mg total) by mouth 2 (two) times daily. for 5 days 10 tablet 11/8/2024 11/8/2024 Jeremias Hagen MD    famotidine (PEPCID) 20 MG tablet Take 1 tablet (20 mg total) by mouth 2 (two) times daily. for 5 days 10 tablet 11/8/2024 11/13/2024 Jeremias Hagen MD    naproxen (NAPROSYN) 500 MG tablet Take 1 tablet (500 mg total) by mouth 2 (two) times daily. for 5 days 10 tablet 11/8/2024 11/13/2024 Jeremias Hagen MD          Follow-up Information    None           Jeremias Hagen MD  11/08/24 5296

## 2024-11-08 NOTE — ED NOTES
Assumed care of pt who came in reporting mid-sternal chest pain now 8/10 that started as intermittent pain and gradually worsened to constant severe pain after working out this afternoon. It is worse with movement and she has had SOB. She is currently in pain holding her chest appearing to in moderate pain but is overall a negative assessment. Will cont to monitor

## 2024-11-08 NOTE — DISCHARGE INSTRUCTIONS
Follow-up with your primary care doctor by calling for appointment.  Continue taking her regular home medications.  Use medications as prescribed and return to the emergency department if condition worsens in any way.

## 2024-11-10 LAB
OHS QRS DURATION: 86 MS
OHS QTC CALCULATION: 443 MS

## 2024-11-14 ENCOUNTER — ON-DEMAND VIRTUAL (OUTPATIENT)
Dept: URGENT CARE | Facility: CLINIC | Age: 34
End: 2024-11-14
Payer: COMMERCIAL

## 2024-11-14 DIAGNOSIS — R30.0 DYSURIA: ICD-10-CM

## 2024-11-14 DIAGNOSIS — N89.8 VAGINAL IRRITATION: Primary | ICD-10-CM

## 2024-11-14 RX ORDER — NITROFURANTOIN (MACROCRYSTALS) 100 MG/1
100 CAPSULE ORAL 2 TIMES DAILY
Qty: 14 CAPSULE | Refills: 0 | Status: SHIPPED | OUTPATIENT
Start: 2024-11-14 | End: 2024-11-21

## 2024-11-14 RX ORDER — FLUCONAZOLE 150 MG/1
150 TABLET ORAL DAILY
Qty: 2 TABLET | Refills: 0 | Status: SHIPPED | OUTPATIENT
Start: 2024-11-14 | End: 2024-11-16

## 2024-11-14 NOTE — PROGRESS NOTES
Subjective:      Patient ID: Unique Cody is a 33 y.o. female.    Vitals:  vitals were not taken for this visit.     Chief Complaint: Urinary Tract Infection      Visit Type: TELE AUDIOVISUAL    Present with the patient at the time of consultation: TELEMED PRESENT WITH PATIENT: None, at work    Past Medical History:   Diagnosis Date    Kidney stone      Past Surgical History:   Procedure Laterality Date    DIAGNOSTIC LAPAROSCOPY      KNEE ARTHROPLASTY Right      Review of patient's allergies indicates:  No Known Allergies  Current Outpatient Medications on File Prior to Visit   Medication Sig Dispense Refill    acetaminophen (TYLENOL) 650 MG TbSR Take 1 tablet (650 mg total) by mouth every 8 (eight) hours.  0    famotidine (PEPCID) 20 MG tablet Take 1 tablet (20 mg total) by mouth 2 (two) times daily. for 5 days 10 tablet 0    ferrous sulfate 325 (65 FE) MG EC tablet Take 1 tablet (325 mg total) by mouth every 48 hours. 45 tablet 1    ibuprofen (ADVIL,MOTRIN) 800 MG tablet Take 1 tablet (800 mg total) by mouth 3 (three) times daily. As needed for pelvic pain 30 tablet 1    [] naproxen (NAPROSYN) 500 MG tablet Take 1 tablet (500 mg total) by mouth 2 (two) times daily. for 5 days 10 tablet 0     No current facility-administered medications on file prior to visit.     Family History   Problem Relation Name Age of Onset    Hypertension Mother      Blindness Neg Hx      Amblyopia Neg Hx      Cancer Neg Hx      Cataracts Neg Hx      Diabetes Neg Hx      Glaucoma Neg Hx      Macular degeneration Neg Hx      Retinal detachment Neg Hx      Strabismus Neg Hx      Stroke Neg Hx      Thyroid disease Neg Hx      Breast cancer Neg Hx      Colon cancer Neg Hx      Ovarian cancer Neg Hx         Medications Ordered                [id number] [address] AT SEC OF [address] [address]    Telephone: [phone]   Fax: [phone]   Hours: Not  open 24 hours                         E-Prescribed (2 of 2)              fluconazole (DIFLUCAN) 150 MG Tab    Sig: Take 1 tablet (150 mg total) by mouth once daily. Take 1 tablet as a single dose. If symptoms persist, may repeat a second dose in 72 hours. for 2 days       Start: 11/14/24     Quantity: 2 tablet Refills: 0                         nitrofurantoin (MACRODANTIN) 100 MG capsule    Sig: Take 1 capsule (100 mg total) by mouth 2 (two) times daily. for 7 days       Start: 11/14/24     Quantity: 14 capsule Refills: 0                           Ohs Peq Odvv Intake    11/14/2024 11:37 AM CST - Filed by Patient   What is your current physical address in the event of a medical emergency? [address]   Are you able to take your vital signs? No   Please attach any relevant images or files    Is your employer contracted with [facility]? No         4 days of vaginal irritation after intercourse. +redness and swelling. No itching or discharge. No odor. Mild dysuria and urgency. No hematuria. No f/c/n/v. No abdominal, back, flank or pelvic pain.     Urinary Tract Infection   Associated symptoms include urgency. Pertinent negatives include no chills, flank pain, frequency, hematuria, nausea or vomiting.       Constitution: Negative for chills and fever.   Gastrointestinal:  Negative for abdominal pain, nausea and vomiting.   Genitourinary:  Positive for dysuria, urgency and vaginal pain. Negative for frequency, flank pain, hematuria, vaginal discharge, vaginal odor and pelvic pain.   Musculoskeletal:  Negative for back pain.   Skin:  Positive for erythema (vaginal).   Allergic/Immunologic: Negative for itching.        Objective:   The physical exam was conducted virtually.  Physical Exam   Constitutional: She is oriented to person, place, and time. She does not appear ill. No distress.   HENT:   Head: Normocephalic and atraumatic.   Nose: Nose normal.   Eyes: Extraocular movement intact    Pulmonary/Chest: Effort normal.   Abdominal: Normal appearance.   Musculoskeletal: Normal range of motion.         General: Normal range of motion.   Neurological: no focal deficit. She is alert and oriented to person, place, and time.   Skin: erythema (vaginal)   Psychiatric: Her behavior is normal. Mood normal.   Vitals reviewed.      Assessment:     1. Vaginal irritation    2. Dysuria        Plan:   Patient encouraged to monitor symptoms closely and instructed to follow-up for new or worsening symptoms. Further, in-person, evaluation may be necessary for continued treatment. Please follow up with your primary care doctor or specialist as needed. Verbally discussed plan. Patient confirms understanding and is in agreement with treatment and plan.     You must understand that you've received a Virtual Care evaluation only and that you may be released before all your medical problems are known or treated. You, the patient, will arrange for follow up care as instructed.      Vaginal irritation  -     fluconazole (DIFLUCAN) 150 MG Tab; Take 1 tablet (150 mg total) by mouth once daily. Take 1 tablet as a single dose. If symptoms persist, may repeat a second dose in 72 hours. for 2 days  Dispense: 2 tablet; Refill: 0    Dysuria  -     nitrofurantoin (MACRODANTIN) 100 MG capsule; Take 1 capsule (100 mg total) by mouth 2 (two) times daily. for 7 days  Dispense: 14 capsule; Refill: 0

## 2025-03-19 ENCOUNTER — OFFICE VISIT (OUTPATIENT)
Dept: OBSTETRICS AND GYNECOLOGY | Facility: CLINIC | Age: 35
End: 2025-03-19
Payer: COMMERCIAL

## 2025-03-19 ENCOUNTER — LAB VISIT (OUTPATIENT)
Dept: LAB | Facility: OTHER | Age: 35
End: 2025-03-19
Attending: OBSTETRICS & GYNECOLOGY
Payer: COMMERCIAL

## 2025-03-19 VITALS
HEIGHT: 67 IN | BODY MASS INDEX: 29.27 KG/M2 | DIASTOLIC BLOOD PRESSURE: 81 MMHG | SYSTOLIC BLOOD PRESSURE: 124 MMHG | WEIGHT: 186.5 LBS

## 2025-03-19 DIAGNOSIS — Z11.3 SCREEN FOR STD (SEXUALLY TRANSMITTED DISEASE): ICD-10-CM

## 2025-03-19 DIAGNOSIS — Z01.419 ENCOUNTER FOR GYNECOLOGICAL EXAMINATION WITHOUT ABNORMAL FINDING: Primary | ICD-10-CM

## 2025-03-19 DIAGNOSIS — R35.0 URINARY FREQUENCY: ICD-10-CM

## 2025-03-19 LAB
HAV IGM SERPL QL IA: NORMAL
HBV CORE IGM SERPL QL IA: NORMAL
HBV SURFACE AG SERPL QL IA: NORMAL
HCV AB SERPL QL IA: NEGATIVE
HIV 1+2 AB+HIV1 P24 AG SERPL QL IA: NEGATIVE
TREPONEMA PALLIDUM IGG+IGM AB [PRESENCE] IN SERUM OR PLASMA BY IMMUNOASSAY: NONREACTIVE

## 2025-03-19 PROCEDURE — 1159F MED LIST DOCD IN RCRD: CPT | Mod: CPTII,S$GLB,, | Performed by: OBSTETRICS & GYNECOLOGY

## 2025-03-19 PROCEDURE — 36415 COLL VENOUS BLD VENIPUNCTURE: CPT | Performed by: OBSTETRICS & GYNECOLOGY

## 2025-03-19 PROCEDURE — 3074F SYST BP LT 130 MM HG: CPT | Mod: CPTII,S$GLB,, | Performed by: OBSTETRICS & GYNECOLOGY

## 2025-03-19 PROCEDURE — 87088 URINE BACTERIA CULTURE: CPT | Performed by: OBSTETRICS & GYNECOLOGY

## 2025-03-19 PROCEDURE — 81515 NFCT DS BV&VAGINITIS DNA ALG: CPT | Performed by: OBSTETRICS & GYNECOLOGY

## 2025-03-19 PROCEDURE — 80074 ACUTE HEPATITIS PANEL: CPT | Performed by: OBSTETRICS & GYNECOLOGY

## 2025-03-19 PROCEDURE — 87147 CULTURE TYPE IMMUNOLOGIC: CPT | Performed by: OBSTETRICS & GYNECOLOGY

## 2025-03-19 PROCEDURE — 99999 PR PBB SHADOW E&M-EST. PATIENT-LVL III: CPT | Mod: PBBFAC,,, | Performed by: OBSTETRICS & GYNECOLOGY

## 2025-03-19 PROCEDURE — 99395 PREV VISIT EST AGE 18-39: CPT | Mod: S$GLB,,, | Performed by: OBSTETRICS & GYNECOLOGY

## 2025-03-19 PROCEDURE — 3008F BODY MASS INDEX DOCD: CPT | Mod: CPTII,S$GLB,, | Performed by: OBSTETRICS & GYNECOLOGY

## 2025-03-19 PROCEDURE — 3079F DIAST BP 80-89 MM HG: CPT | Mod: CPTII,S$GLB,, | Performed by: OBSTETRICS & GYNECOLOGY

## 2025-03-19 PROCEDURE — 86593 SYPHILIS TEST NON-TREP QUANT: CPT | Performed by: OBSTETRICS & GYNECOLOGY

## 2025-03-19 PROCEDURE — 87086 URINE CULTURE/COLONY COUNT: CPT | Performed by: OBSTETRICS & GYNECOLOGY

## 2025-03-19 PROCEDURE — 87624 HPV HI-RISK TYP POOLED RSLT: CPT | Performed by: OBSTETRICS & GYNECOLOGY

## 2025-03-19 PROCEDURE — 87491 CHLMYD TRACH DNA AMP PROBE: CPT | Performed by: OBSTETRICS & GYNECOLOGY

## 2025-03-19 PROCEDURE — 1160F RVW MEDS BY RX/DR IN RCRD: CPT | Mod: CPTII,S$GLB,, | Performed by: OBSTETRICS & GYNECOLOGY

## 2025-03-19 PROCEDURE — 87389 HIV-1 AG W/HIV-1&-2 AB AG IA: CPT | Performed by: OBSTETRICS & GYNECOLOGY

## 2025-03-19 NOTE — PROGRESS NOTES
HISTORY OF PRESENT ILLNESS:    Unique Cody is a 34 y.o. female, , Patient's last menstrual period was 2025 (exact date).,  presents for a routine exam and has no complaints.  Patient reports cycles occur every 4 weeks lasting 5-6 days using 1-2 cups per day.   She denies any BTB.     History of abnormal pap: No  Last Pap:   Last MMG: N/A  Last Colonoscopy: N/A     She uses no birth control.   She does not desire STD screening.    The patient participates in regular exercise: no   The patient does not smoke.    The patient wears seatbelts.     Pt denies any domestic violence.    Past Medical History:   Diagnosis Date    Kidney stone        Past Surgical History:   Procedure Laterality Date    DIAGNOSTIC LAPAROSCOPY      KNEE ARTHROPLASTY Right        MEDICATIONS AND ALLERGIES:    Current Medications[1]    Review of patient's allergies indicates:  No Known Allergies    Family History   Problem Relation Name Age of Onset    Hypertension Mother      Blindness Neg Hx      Amblyopia Neg Hx      Cancer Neg Hx      Cataracts Neg Hx      Diabetes Neg Hx      Glaucoma Neg Hx      Macular degeneration Neg Hx      Retinal detachment Neg Hx      Strabismus Neg Hx      Stroke Neg Hx      Thyroid disease Neg Hx      Breast cancer Neg Hx      Colon cancer Neg Hx      Ovarian cancer Neg Hx         Social History[2]    COMPREHENSIVE GYN HISTORY:  PAP History: Denies abnormal Paps.  Infection History: Denies STDs. Denies PID.  Benign History: Denies uterine fibroids. Denies ovarian cysts. Denies endometriosis. Denies other conditions.  Cancer History: Denies cervical cancer. Denies uterine cancer or hyperplasia. Denies ovarian cancer. Denies vulvar cancer or pre-cancer. Denies vaginal cancer or pre-cancer. Denies breast cancer. Denies colon cancer.  Sexual Activity History: Reports currently being sexually active  Menstrual History: Monthly. Mod then light flow.   Dysmenorrhea History: Reports mild dysmenorrhea.  "      ROS:  GENERAL: No weight changes. No swelling. No fatigue. No fever.  CARDIOVASCULAR: No chest pain. No shortness of breath. No leg cramps.   NEUROLOGICAL: No headaches. No vision changes.  BREASTS: No pain. No lumps. No discharge.  ABDOMEN: No pain. No nausea. No vomiting. No diarrhea. No constipation.  REPRODUCTIVE: No abnormal bleeding.   VULVA: No pain. No lesions. No itching.  VAGINA: No relaxation. No itching. No odor. No discharge. No lesions.  URINARY: No incontinence. No nocturia. No frequency. No dysuria.    /81   Ht 5' 7" (1.702 m)   Wt 84.6 kg (186 lb 8.2 oz)   LMP 01/01/2025 (Exact Date)   BMI 29.21 kg/m²     PE:  APPEARANCE: Well nourished, well developed, in no acute distress.  AFFECT: WNL, alert and oriented x 3.  SKIN: No acne or hirsutism.  NECK: Neck symmetric, without masses or thyromegaly.  NODES: No inguinal, cervical, axillary or femoral lymph node enlargement.  CHEST: Good respiratory effort.   ABDOMEN: Soft. No tenderness or masses. No hepatosplenomegaly. No hernias.  BREASTS: Symmetrical, no skin changes, visible lesions, palpable masses or nipple discharge bilaterally.  PELVIC: External female genitalia without lesions.  Female hair distribution. Adequate perineal body, Normal urethral meatus. Vagina moist and well rugated without lesions or discharge.  No significant cystocele or rectocele present. Cervix pink without lesions, discharge or tenderness. Uterus is 4-6 week size, regular, mobile and nontender. Adnexa without masses or tenderness.  EXTREMITIES: No edema    DIAGNOSIS:  1. Encounter for gynecological examination without abnormal finding    2. Urinary frequency    3. Screen for STD (sexually transmitted disease)        PLAN:    Orders Placed This Encounter    HPV High Risk Genotypes, PCR    C. trachomatis/N. gonorrhoeae by AMP DNA [facility]; Cervicovaginal    Vaginosis Screen by DNA Probe    Hepatitis Panel, Acute    HIV 1/2 Ag/Ab (4th Gen)    Treponema Pallidium " Antibodies IgG, IgM    Urinalysis, Reflex to Urine Culture Urine, Clean Catch    Liquid-Based Pap Smear, Screening       COUNSELING:  The patient was counseled today on:  -A.C.S. Pap and pelvic exam guidelines (pap every 3 years), recomendations for yearly mammogram;  -to follow up with her PCP for other health maintenance.    FOLLOW-UP with me annually.          [1]   Current Outpatient Medications:     acetaminophen (TYLENOL) 650 MG TbSR, Take 1 tablet (650 mg total) by mouth every 8 (eight) hours., Disp: , Rfl: 0    ferrous sulfate 325 (65 FE) MG EC tablet, Take 1 tablet (325 mg total) by mouth every 48 hours., Disp: 45 tablet, Rfl: 1    ibuprofen (ADVIL,MOTRIN) 800 MG tablet, Take 1 tablet (800 mg total) by mouth 3 (three) times daily. As needed for pelvic pain, Disp: 30 tablet, Rfl: 1    famotidine (PEPCID) 20 MG tablet, Take 1 tablet (20 mg total) by mouth 2 (two) times daily. for 5 days, Disp: 10 tablet, Rfl: 0  [2]   Social History  Socioeconomic History    Marital status: Single   Tobacco Use    Smoking status: Never     Passive exposure: Never    Smokeless tobacco: Never   Substance and Sexual Activity    Alcohol use: No    Drug use: No    Sexual activity: Yes     Partners: Male     Birth control/protection: None

## 2025-03-20 ENCOUNTER — RESULTS FOLLOW-UP (OUTPATIENT)
Dept: OBSTETRICS AND GYNECOLOGY | Facility: CLINIC | Age: 35
End: 2025-03-20

## 2025-03-20 LAB
BACTERIA UR CULT: ABNORMAL
BACTERIAL VAGINOSIS DNA: NOT DETECTED
CANDIDA GLABRATA/KRUSEI: NOT DETECTED
CANDIDA RRNA VAG QL PROBE: DETECTED
TRICHOMONAS VAGINALIS: NOT DETECTED

## 2025-03-21 LAB
C TRACH DNA SPEC QL NAA+PROBE: NOT DETECTED
N GONORRHOEA DNA SPEC QL NAA+PROBE: NOT DETECTED

## 2025-03-22 LAB
FINAL PATHOLOGIC DIAGNOSIS: NORMAL
Lab: NORMAL

## 2025-03-24 ENCOUNTER — TELEPHONE (OUTPATIENT)
Dept: OBSTETRICS AND GYNECOLOGY | Facility: CLINIC | Age: 35
End: 2025-03-24
Payer: COMMERCIAL

## 2025-03-24 NOTE — TELEPHONE ENCOUNTER
----- Message from Nga sent at 3/24/2025 11:59 AM CDT -----  Patient called in about results she received from labs. Patient will like a call back to discuss.

## 2025-03-25 ENCOUNTER — TELEPHONE (OUTPATIENT)
Dept: OBSTETRICS AND GYNECOLOGY | Facility: CLINIC | Age: 35
End: 2025-03-25
Payer: COMMERCIAL

## 2025-03-25 RX ORDER — AMOXICILLIN AND CLAVULANATE POTASSIUM 875; 125 MG/1; MG/1
1 TABLET, FILM COATED ORAL
Status: CANCELLED | OUTPATIENT
Start: 2025-03-25 | End: 2025-03-25

## 2025-03-26 ENCOUNTER — OCCUPATIONAL HEALTH (OUTPATIENT)
Dept: URGENT CARE | Facility: CLINIC | Age: 35
End: 2025-03-26

## 2025-03-26 PROCEDURE — 80305 DRUG TEST PRSMV DIR OPT OBS: CPT | Mod: S$GLB,,, | Performed by: EMERGENCY MEDICINE

## 2025-03-26 PROCEDURE — 82075 ASSAY OF BREATH ETHANOL: CPT | Mod: S$GLB,,, | Performed by: EMERGENCY MEDICINE

## 2025-03-31 ENCOUNTER — TELEPHONE (OUTPATIENT)
Dept: OBSTETRICS AND GYNECOLOGY | Facility: CLINIC | Age: 35
End: 2025-03-31
Payer: COMMERCIAL

## 2025-03-31 DIAGNOSIS — R35.0 URINARY FREQUENCY: Primary | ICD-10-CM

## 2025-03-31 RX ORDER — AMOXICILLIN AND CLAVULANATE POTASSIUM 875; 125 MG/1; MG/1
1 TABLET, FILM COATED ORAL EVERY 12 HOURS
Qty: 14 TABLET | Refills: 0 | Status: SHIPPED | OUTPATIENT
Start: 2025-03-31

## 2025-03-31 RX ORDER — FLUCONAZOLE 150 MG/1
150 TABLET ORAL ONCE
Qty: 1 TABLET | Refills: 0 | Status: SHIPPED | OUTPATIENT
Start: 2025-03-31 | End: 2025-03-31

## 2025-03-31 RX ORDER — NITROFURANTOIN 25; 75 MG/1; MG/1
100 CAPSULE ORAL 2 TIMES DAILY
Qty: 14 CAPSULE | Refills: 0 | Status: SHIPPED | OUTPATIENT
Start: 2025-03-31 | End: 2025-03-31 | Stop reason: ALTCHOICE

## 2025-03-31 NOTE — TELEPHONE ENCOUNTER
Called to speak with patient regarding message is sent stating that she currently has urinary symptoms.  Patient is urine culture showed group B strep with decreased amount of colonies.  However secondary to symptoms will send medication to the pharmacy at this point.  Called to discuss with patient in detail no answer.  Left a message for patient to give us a call at the office.   Patient to take Augmentin 1 pill twice a day for 7 days.  Previously patient stated that yeast infection occurred after antibiotics Diflucan pill was also sent.

## 2025-03-31 NOTE — TELEPHONE ENCOUNTER
----- Message from Meche sent at 3/31/2025  4:29 PM CDT -----  Contact: [phone] Patient  Patient is returning a phone call.Who left a message for the patient: Belkys Nguyễn, Jose patient know what this is regarding:  Would you like a call back, or a response through your [facility] portal?:   Call Back Please. Thank youComments:

## 2025-06-04 ENCOUNTER — PATIENT MESSAGE (OUTPATIENT)
Dept: PRIMARY CARE CLINIC | Facility: CLINIC | Age: 35
End: 2025-06-04

## 2025-06-04 ENCOUNTER — LAB VISIT (OUTPATIENT)
Dept: LAB | Facility: HOSPITAL | Age: 35
End: 2025-06-04
Attending: NURSE PRACTITIONER
Payer: COMMERCIAL

## 2025-06-04 ENCOUNTER — OFFICE VISIT (OUTPATIENT)
Dept: PRIMARY CARE CLINIC | Facility: CLINIC | Age: 35
End: 2025-06-04
Payer: COMMERCIAL

## 2025-06-04 ENCOUNTER — TELEPHONE (OUTPATIENT)
Dept: PRIMARY CARE CLINIC | Facility: CLINIC | Age: 35
End: 2025-06-04

## 2025-06-04 VITALS
BODY MASS INDEX: 28.86 KG/M2 | WEIGHT: 183.88 LBS | SYSTOLIC BLOOD PRESSURE: 130 MMHG | OXYGEN SATURATION: 99 % | DIASTOLIC BLOOD PRESSURE: 72 MMHG | HEIGHT: 67 IN | HEART RATE: 73 BPM | RESPIRATION RATE: 16 BRPM

## 2025-06-04 DIAGNOSIS — R10.9 ABDOMINAL PAIN, UNSPECIFIED ABDOMINAL LOCATION: ICD-10-CM

## 2025-06-04 DIAGNOSIS — K92.1 BLOOD IN STOOL: ICD-10-CM

## 2025-06-04 DIAGNOSIS — R10.9 ABDOMINAL PAIN, UNSPECIFIED ABDOMINAL LOCATION: Primary | ICD-10-CM

## 2025-06-04 DIAGNOSIS — R30.0 DYSURIA: ICD-10-CM

## 2025-06-04 LAB
ABSOLUTE EOSINOPHIL (OHS): 0.02 K/UL
ABSOLUTE MONOCYTE (OHS): 0.49 K/UL (ref 0.3–1)
ABSOLUTE NEUTROPHIL COUNT (OHS): 3.74 K/UL (ref 1.8–7.7)
ALBUMIN SERPL BCP-MCNC: 3.9 G/DL (ref 3.5–5.2)
ALP SERPL-CCNC: 56 UNIT/L (ref 40–150)
ALT SERPL W/O P-5'-P-CCNC: 11 UNIT/L (ref 10–44)
ANION GAP (OHS): 6 MMOL/L (ref 8–16)
AST SERPL-CCNC: 17 UNIT/L (ref 11–45)
B-HCG UR QL: NEGATIVE
BASOPHILS # BLD AUTO: 0.03 K/UL
BASOPHILS NFR BLD AUTO: 0.5 %
BILIRUB SERPL-MCNC: 0.3 MG/DL (ref 0.1–1)
BILIRUB SERPL-MCNC: NEGATIVE MG/DL
BLOOD URINE, POC: NORMAL
BUN SERPL-MCNC: 13 MG/DL (ref 6–20)
CALCIUM SERPL-MCNC: 8.5 MG/DL (ref 8.7–10.5)
CHLORIDE SERPL-SCNC: 105 MMOL/L (ref 95–110)
CLARITY, POC UA: NORMAL
CO2 SERPL-SCNC: 26 MMOL/L (ref 23–29)
COLOR, POC UA: YELLOW
CREAT SERPL-MCNC: 0.9 MG/DL (ref 0.5–1.4)
CTP QC/QA: YES
ERYTHROCYTE [DISTWIDTH] IN BLOOD BY AUTOMATED COUNT: 13.5 % (ref 11.5–14.5)
GFR SERPLBLD CREATININE-BSD FMLA CKD-EPI: >60 ML/MIN/1.73/M2
GLUCOSE SERPL-MCNC: 83 MG/DL (ref 70–110)
GLUCOSE UR QL STRIP: NEGATIVE
HCT VFR BLD AUTO: 38 % (ref 37–48.5)
HGB BLD-MCNC: 12 GM/DL (ref 12–16)
IMM GRANULOCYTES # BLD AUTO: 0.01 K/UL (ref 0–0.04)
IMM GRANULOCYTES NFR BLD AUTO: 0.2 % (ref 0–0.5)
KETONES UR QL STRIP: NORMAL
LEUKOCYTE ESTERASE URINE, POC: NORMAL
LYMPHOCYTES # BLD AUTO: 1.86 K/UL (ref 1–4.8)
MCH RBC QN AUTO: 27.3 PG (ref 27–31)
MCHC RBC AUTO-ENTMCNC: 31.6 G/DL (ref 32–36)
MCV RBC AUTO: 86 FL (ref 82–98)
NITRITE, POC UA: NEGATIVE
NUCLEATED RBC (/100WBC) (OHS): 0 /100 WBC
PH, POC UA: 7.5
PLATELET # BLD AUTO: 319 K/UL (ref 150–450)
PMV BLD AUTO: 10.1 FL (ref 9.2–12.9)
POTASSIUM SERPL-SCNC: 4.2 MMOL/L (ref 3.5–5.1)
PROT SERPL-MCNC: 6.8 GM/DL (ref 6–8.4)
PROTEIN, POC: NORMAL
RBC # BLD AUTO: 4.4 M/UL (ref 4–5.4)
RELATIVE EOSINOPHIL (OHS): 0.3 %
RELATIVE LYMPHOCYTE (OHS): 30.2 % (ref 18–48)
RELATIVE MONOCYTE (OHS): 8 % (ref 4–15)
RELATIVE NEUTROPHIL (OHS): 60.8 % (ref 38–73)
SODIUM SERPL-SCNC: 137 MMOL/L (ref 136–145)
SPECIFIC GRAVITY, POC UA: 1.01
UROBILINOGEN, POC UA: 0.2
WBC # BLD AUTO: 6.15 K/UL (ref 3.9–12.7)

## 2025-06-04 PROCEDURE — 87086 URINE CULTURE/COLONY COUNT: CPT | Performed by: NURSE PRACTITIONER

## 2025-06-04 PROCEDURE — 36415 COLL VENOUS BLD VENIPUNCTURE: CPT | Mod: PN

## 2025-06-04 PROCEDURE — 99999 PR PBB SHADOW E&M-EST. PATIENT-LVL IV: CPT | Mod: PBBFAC,,, | Performed by: NURSE PRACTITIONER

## 2025-06-04 PROCEDURE — 82040 ASSAY OF SERUM ALBUMIN: CPT

## 2025-06-04 PROCEDURE — 87491 CHLMYD TRACH DNA AMP PROBE: CPT | Performed by: NURSE PRACTITIONER

## 2025-06-04 PROCEDURE — 85025 COMPLETE CBC W/AUTO DIFF WBC: CPT

## 2025-06-04 RX ORDER — CEPHALEXIN 500 MG/1
500 TABLET, FILM COATED ORAL 4 TIMES DAILY
Qty: 20 TABLET | Refills: 0 | Status: SHIPPED | OUTPATIENT
Start: 2025-06-04 | End: 2025-06-09

## 2025-06-05 ENCOUNTER — RESULTS FOLLOW-UP (OUTPATIENT)
Dept: PRIMARY CARE CLINIC | Facility: CLINIC | Age: 35
End: 2025-06-05

## 2025-06-05 LAB
BACTERIA UR CULT: NORMAL
C TRACH DNA SPEC QL NAA+PROBE: NOT DETECTED
CTGC SOURCE (OHS) ORD-325: NORMAL
N GONORRHOEA DNA UR QL NAA+PROBE: NOT DETECTED

## 2025-06-06 ENCOUNTER — HOSPITAL ENCOUNTER (EMERGENCY)
Facility: HOSPITAL | Age: 35
Discharge: HOME | End: 2025-06-06
Attending: STUDENT IN AN ORGANIZED HEALTH CARE EDUCATION/TRAINING PROGRAM
Payer: COMMERCIAL

## 2025-06-06 VITALS
HEART RATE: 64 BPM | RESPIRATION RATE: 18 BRPM | HEIGHT: 67 IN | BODY MASS INDEX: 27.47 KG/M2 | OXYGEN SATURATION: 100 % | SYSTOLIC BLOOD PRESSURE: 120 MMHG | DIASTOLIC BLOOD PRESSURE: 66 MMHG | WEIGHT: 175 LBS | TEMPERATURE: 98 F

## 2025-06-06 DIAGNOSIS — R10.31 RIGHT LOWER QUADRANT ABDOMINAL PAIN: Primary | ICD-10-CM

## 2025-06-06 DIAGNOSIS — K59.00 CONSTIPATION, UNSPECIFIED CONSTIPATION TYPE: ICD-10-CM

## 2025-06-06 LAB
ABSOLUTE EOSINOPHIL (OHS): 0.04 K/UL
ABSOLUTE MONOCYTE (OHS): 0.5 K/UL (ref 0.3–1)
ABSOLUTE NEUTROPHIL COUNT (OHS): 3.22 K/UL (ref 1.8–7.7)
ALBUMIN SERPL BCP-MCNC: 3.7 G/DL (ref 3.5–5.2)
ALP SERPL-CCNC: 60 UNIT/L (ref 38–126)
ALT SERPL W/O P-5'-P-CCNC: 15 UNIT/L (ref 10–44)
ANION GAP (OHS): 9 MMOL/L (ref 8–16)
AST SERPL-CCNC: 19 UNIT/L (ref 15–46)
B-HCG UR QL: NEGATIVE
BACTERIA #/AREA URNS HPF: ABNORMAL /HPF
BASOPHILS # BLD AUTO: 0.03 K/UL
BASOPHILS NFR BLD AUTO: 0.5 %
BILIRUB SERPL-MCNC: 0.1 MG/DL (ref 0.1–1)
BILIRUB UR QL STRIP.AUTO: NEGATIVE
BUN SERPL-MCNC: 10 MG/DL (ref 7–17)
CALCIUM SERPL-MCNC: 8.6 MG/DL (ref 8.7–10.5)
CHLORIDE SERPL-SCNC: 104 MMOL/L (ref 95–110)
CLARITY UR: CLEAR
CO2 SERPL-SCNC: 24 MMOL/L (ref 23–29)
COLOR UR AUTO: YELLOW
CREAT SERPL-MCNC: 0.7 MG/DL (ref 0.5–1.4)
CTP QC/QA: YES
ERYTHROCYTE [DISTWIDTH] IN BLOOD BY AUTOMATED COUNT: 13.2 % (ref 11.5–14.5)
GFR SERPLBLD CREATININE-BSD FMLA CKD-EPI: >60 ML/MIN/1.73/M2
GLUCOSE SERPL-MCNC: 102 MG/DL (ref 70–110)
GLUCOSE UR QL STRIP: NEGATIVE
HCT VFR BLD AUTO: 34.5 % (ref 37–48.5)
HGB BLD-MCNC: 11.4 GM/DL (ref 12–16)
HGB UR QL STRIP: NEGATIVE
HOLD SPECIMEN: NORMAL
IMM GRANULOCYTES # BLD AUTO: 0.01 K/UL (ref 0–0.04)
IMM GRANULOCYTES NFR BLD AUTO: 0.2 % (ref 0–0.5)
KETONES UR QL STRIP: NEGATIVE
LEUKOCYTE ESTERASE UR QL STRIP: ABNORMAL
LIPASE SERPL-CCNC: 62 U/L (ref 23–300)
LYMPHOCYTES # BLD AUTO: 2.24 K/UL (ref 1–4.8)
MCH RBC QN AUTO: 27.7 PG (ref 27–31)
MCHC RBC AUTO-ENTMCNC: 33 G/DL (ref 32–36)
MCV RBC AUTO: 84 FL (ref 82–98)
MICROSCOPIC COMMENT: ABNORMAL
NITRITE UR QL STRIP: NEGATIVE
NUCLEATED RBC (/100WBC) (OHS): 0 /100 WBC
PH UR STRIP: 6 [PH]
PLATELET # BLD AUTO: 282 K/UL (ref 150–450)
PMV BLD AUTO: 9.4 FL (ref 9.2–12.9)
POTASSIUM SERPL-SCNC: 3.9 MMOL/L (ref 3.5–5.1)
PROT SERPL-MCNC: 6.5 GM/DL (ref 6–8.4)
PROT UR QL STRIP: NEGATIVE
RBC # BLD AUTO: 4.11 M/UL (ref 4–5.4)
RBC #/AREA URNS HPF: 0 /HPF (ref 0–4)
RELATIVE EOSINOPHIL (OHS): 0.7 %
RELATIVE LYMPHOCYTE (OHS): 37.1 % (ref 18–48)
RELATIVE MONOCYTE (OHS): 8.3 % (ref 4–15)
RELATIVE NEUTROPHIL (OHS): 53.2 % (ref 38–73)
SODIUM SERPL-SCNC: 137 MMOL/L (ref 136–145)
SP GR UR STRIP: 1.02
SQUAMOUS #/AREA URNS HPF: 8 /HPF
UROBILINOGEN UR STRIP-ACNC: NEGATIVE EU/DL
WBC # BLD AUTO: 6.04 K/UL (ref 3.9–12.7)
WBC #/AREA URNS HPF: 3 /HPF (ref 0–5)

## 2025-06-06 PROCEDURE — 83690 ASSAY OF LIPASE: CPT | Mod: ER | Performed by: STUDENT IN AN ORGANIZED HEALTH CARE EDUCATION/TRAINING PROGRAM

## 2025-06-06 PROCEDURE — 25500020 PHARM REV CODE 255: Mod: ER | Performed by: STUDENT IN AN ORGANIZED HEALTH CARE EDUCATION/TRAINING PROGRAM

## 2025-06-06 PROCEDURE — 80053 COMPREHEN METABOLIC PANEL: CPT | Mod: ER | Performed by: STUDENT IN AN ORGANIZED HEALTH CARE EDUCATION/TRAINING PROGRAM

## 2025-06-06 PROCEDURE — 99285 EMERGENCY DEPT VISIT HI MDM: CPT | Mod: 25,ER

## 2025-06-06 PROCEDURE — 96372 THER/PROPH/DIAG INJ SC/IM: CPT | Performed by: STUDENT IN AN ORGANIZED HEALTH CARE EDUCATION/TRAINING PROGRAM

## 2025-06-06 PROCEDURE — 81001 URINALYSIS AUTO W/SCOPE: CPT | Mod: ER | Performed by: STUDENT IN AN ORGANIZED HEALTH CARE EDUCATION/TRAINING PROGRAM

## 2025-06-06 PROCEDURE — 96374 THER/PROPH/DIAG INJ IV PUSH: CPT | Mod: ER

## 2025-06-06 PROCEDURE — 85025 COMPLETE CBC W/AUTO DIFF WBC: CPT | Mod: ER | Performed by: STUDENT IN AN ORGANIZED HEALTH CARE EDUCATION/TRAINING PROGRAM

## 2025-06-06 PROCEDURE — 81025 URINE PREGNANCY TEST: CPT | Mod: ER | Performed by: STUDENT IN AN ORGANIZED HEALTH CARE EDUCATION/TRAINING PROGRAM

## 2025-06-06 PROCEDURE — 63600175 PHARM REV CODE 636 W HCPCS: Mod: ER | Performed by: STUDENT IN AN ORGANIZED HEALTH CARE EDUCATION/TRAINING PROGRAM

## 2025-06-06 RX ORDER — POLYETHYLENE GLYCOL 3350 17 G/17G
17 POWDER, FOR SOLUTION ORAL DAILY
Qty: 510 G | Refills: 0 | Status: SHIPPED | OUTPATIENT
Start: 2025-06-06 | End: 2025-07-06

## 2025-06-06 RX ORDER — KETOROLAC TROMETHAMINE 30 MG/ML
15 INJECTION, SOLUTION INTRAMUSCULAR; INTRAVENOUS
Status: COMPLETED | OUTPATIENT
Start: 2025-06-06 | End: 2025-06-06

## 2025-06-06 RX ORDER — POLYETHYLENE GLYCOL 3350 17 G/17G
17 POWDER, FOR SOLUTION ORAL DAILY
Qty: 510 G | Refills: 0 | Status: SHIPPED | OUTPATIENT
Start: 2025-06-06 | End: 2025-06-06

## 2025-06-06 RX ORDER — DICYCLOMINE HYDROCHLORIDE 10 MG/ML
20 INJECTION INTRAMUSCULAR
Status: COMPLETED | OUTPATIENT
Start: 2025-06-06 | End: 2025-06-06

## 2025-06-06 RX ADMIN — IOHEXOL 100 ML: 350 INJECTION, SOLUTION INTRAVENOUS at 01:06

## 2025-06-06 RX ADMIN — DICYCLOMINE HYDROCHLORIDE 20 MG: 10 INJECTION, SOLUTION INTRAMUSCULAR at 02:06

## 2025-06-06 RX ADMIN — KETOROLAC TROMETHAMINE 15 MG: 30 INJECTION, SOLUTION INTRAMUSCULAR; INTRAVENOUS at 12:06

## 2025-06-06 NOTE — Clinical Note
"Unique"Daniela Cody was seen and treated in our emergency department on 6/5/2025.  She may return to work on 06/09/2025.       If you have any questions or concerns, please don't hesitate to call.      Blu Horne MD"

## 2025-06-06 NOTE — ED PROVIDER NOTES
"ED Provider Note - 6/5/2025    History     Chief Complaint   Patient presents with    Abdominal Pain     Pt c/o abdominal pain starting x1 week ago, recently had lab work for same complaint, dx with UTI Tuesday and started antibiotics. Reports pain is more "sharp" today. Pointing at RLQ. Denies n/v/d or fever. Reports dysuria about the same since dx with uti        HPI     Unique Cody is a 34 y.o. year old female with past medical and surgical history as seen below, presenting with chief complaint of right lower quadrant abdominal pain.  States has been going on for several days intermittently.  Diagnosed with a UTI by primary care and started on Keflex.  Abdominal pain has persisted so came to the emergency department for further evaluation.        Past Medical History:   Diagnosis Date    Kidney stone      Past Surgical History:   Procedure Laterality Date    DIAGNOSTIC LAPAROSCOPY      KNEE ARTHROPLASTY Right          Family History   Problem Relation Name Age of Onset    Hypertension Mother      Blindness Neg Hx      Amblyopia Neg Hx      Cancer Neg Hx      Cataracts Neg Hx      Diabetes Neg Hx      Glaucoma Neg Hx      Macular degeneration Neg Hx      Retinal detachment Neg Hx      Strabismus Neg Hx      Stroke Neg Hx      Thyroid disease Neg Hx      Breast cancer Neg Hx      Colon cancer Neg Hx      Ovarian cancer Neg Hx       Social History[1]  Social Drivers of Health with Concerns     Alcohol Use: Not on file   Financial Resource Strain: Not on file   Food Insecurity: Not on file   Transportation Needs: Not on file   Physical Activity: Not on file   Stress: Not on file   Housing Stability: Not on file   Utilities: Not on file   Health Literacy: Not on file   Social Isolation: Not on file      Review of patient's allergies indicates:  No Known Allergies    Review of Systems     A full Review of Systems (ROS) was performed and was negative unless otherwise stated in the HPI.      Physical Exam     Vitals: " "   06/06/25 0005 06/06/25 0334   BP: 139/88 120/66   BP Location: Left arm Right arm   Patient Position:  Lying   Pulse: 69 64   Resp: 17 18   Temp: 97.7 °F (36.5 °C)    TempSrc: Oral    SpO2: 100% 100%   Weight: 79.4 kg (175 lb)    Height: 5' 7" (1.702 m)         Physical Exam    Nursing note and vitals reviewed.  Constitutional: She appears well-developed and well-nourished.   HENT:   Head: Normocephalic and atraumatic. Mouth/Throat: Oropharynx is clear and moist.   Eyes: Conjunctivae and EOM are normal. Pupils are equal, round, and reactive to light.   Neck: Neck supple. No tracheal deviation present.   Normal range of motion.  Cardiovascular:  Normal rate and regular rhythm.  No extrasystoles are present.          No murmur heard.  Pulmonary/Chest: Breath sounds normal. No stridor. No respiratory distress. She has no wheezes. She has no rhonchi.   Abdominal: Abdomen is soft. She exhibits no distension. There is abdominal tenderness in the right lower quadrant. No hernia. There is guarding and tenderness at McBurney's point.   Musculoskeletal:         General: No edema. Normal range of motion.      Cervical back: Normal range of motion and neck supple.     Neurological: She is alert and oriented to person, place, and time. She has normal strength. No cranial nerve deficit.   Skin: Skin is warm and dry. No rash noted.   Psychiatric: She has a normal mood and affect. Her speech is normal and behavior is normal.         Lab Results- Independently reviewed by myself      Labs Reviewed   COMPREHENSIVE METABOLIC PANEL - Abnormal       Result Value    Sodium 137      Potassium 3.9      Chloride 104      CO2 24      Glucose 102      BUN 10      Creatinine 0.7      Calcium 8.6 (*)     Protein Total 6.5      Albumin 3.7      Bilirubin Total 0.1      ALP 60      AST 19      ALT 15      Anion Gap 9      eGFR >60     URINALYSIS, REFLEX TO URINE CULTURE - Abnormal    Color, UA Yellow      Appearance, UA Clear      pH, UA 6.0   "    Spec Grav UA 1.020      Protein, UA Negative      Glucose, UA Negative      Ketones, UA Negative      Bilirubin, UA Negative      Blood, UA Negative      Nitrites, UA Negative      Urobilinogen, UA Negative      Leukocyte Esterase, UA Trace (*)    CBC WITH DIFFERENTIAL - Abnormal    WBC 6.04      RBC 4.11      HGB 11.4 (*)     HCT 34.5 (*)     MCV 84      MCH 27.7      MCHC 33.0      RDW 13.2      Platelet Count 282      MPV 9.4      Nucleated RBC 0      Neut % 53.2      Lymph % 37.1      Mono % 8.3      Eos % 0.7      Basophil % 0.5      Imm Grans % 0.2      Neut # 3.22      Lymph # 2.24      Mono # 0.50      Eos # 0.04      Baso # 0.03      Imm Grans # 0.01     URINALYSIS MICROSCOPIC - Abnormal    RBC, UA 0      WBC, UA 3      Bacteria, UA Few (*)     Squamous Epithelial Cells, UA 8      Microscopic Comment       LIPASE - Normal    Lipase Level 62     CBC W/ AUTO DIFFERENTIAL    Narrative:     The following orders were created for panel order CBC W/ AUTO DIFFERENTIAL.  Procedure                               Abnormality         Status                     ---------                               -----------         ------                     CBC with Differential[[id number]]       Abnormal            Final result                 Please view results for these tests on the individual orders.   GREY TOP URINE HOLD    Extra Tube Hold for add-ons.     POCT URINE PREGNANCY    POC Preg Test, Ur Negative       Acceptable Yes             Imaging     Imaging Results              CT Abdomen Pelvis With IV Contrast NO Oral Contrast (Final result)  Result time 06/06/25 07:12:17      Final result by Corby Gomes MD (06/06/25 07:12:17)                   Impression:      1.  Negative for acute process involving the abdomen or pelvis.  Normal appendix.  Negative for renal stone disease or hydronephrosis.  Negative for free air.  Negative for inflammatory changes.    2. Increased stool burden.  Could the  patient's symptoms be related to constipation?    3.  Nonemergent findings as described above.    4.  I agree with the preliminary interpretation rendered.    All CT scans at this facility are performed  using dose modulation techniques as appropriate to performed exam including the following:  automated exposure control; adjustment of mA and/or kV according to the patients size (this includes techniques or standardized protocols for targeted exams where dose is matched to indication/reason for exam: i.e. extremities or head);  iterative reconstruction technique.      Electronically signed by: Corby Gomes MD  Date:    06/06/2025  Time:    07:12               Narrative:    EXAMINATION:  CT ABDOMEN PELVIS WITH IV CONTRAST, multiplanar reconstructions    CLINICAL HISTORY:  RLQ abdominal pain (Age >= 14y);    TECHNIQUE:  Axial images through the abdomen and pelvis were obtained with the use of IV contrast.  Sagittal and coronal reconstructions are provided for review.  Oral contrast was not utilized.    COMPARISON:  March 29, 2016    FINDINGS:  LUNG BASES:   Lung bases are clear.  Negative for pleural or pericardial effusions. The distal esophagus is normal.  Negative for coronary artery calcifications.    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  Early excretion of contrast into the bilateral renal collecting systems could obscure small nonobstructing stones.  The liver, spleen and gallbladder appear normal.  The pancreas is unremarkable.  Kidneys and adrenal glands are wise normal.    Negative for adenopathy, ascites or inflammatory change noted within the abdomen or pelvis.  Negative for vascular abnormalities.  Portal vein is patent.    Normal appendix.  Increased stool burden.  Negative for dilated loops of large or small bowel.  Negative for free air.    PELVIS: The urinary bladder is unremarkable.  The uterus is tilted to the right.  The female pelvic organs are normal. There are pelvic  phleboliths.    The abdominal wall is intact.    No significant osseous abnormality is identified.  Negative for significant spinal canal stenosis. Mild convex right curvature of the thoracolumbar junction.    Negative for groin adenopathy.                                                 ED Course         Procedures         Orders Placed This Encounter    CT Abdomen Pelvis With IV Contrast NO Oral Contrast    CBC W/ AUTO DIFFERENTIAL    Comp. Metabolic Panel    Lipase    Urinalysis, Reflex to Urine Culture Urine, Clean Catch    CBC with Differential    GREY TOP URINE HOLD    Urinalysis Microscopic    POCT urine pregnancy    Insert peripheral IV    ketorolac injection 15 mg    iohexoL (OMNIPAQUE 350) injection 100 mL    dicyclomine injection 20 mg    polyethylene glycol (GLYCOLAX) 17 gram/dose powder          ED Course as of 06/10/25 0635   Fri Jun 06, 2025   0320 CT Abdomen Pelvis With IV Contrast NO Oral Contrast  [facility] overnight preliminary read of CT abdomen/pelvis with IV contrast:  Impression-mild fecal retention, correlate for constipation.  Normal appendix.   [KB]      ED Course User Index  [KB] Blu Horne MD              Medical Decision Making       The patient's list of active medical problems, social history, medications, and allergies as documented per RN staff has been reviewed.     _      Medical Decision Making  Patient presents with right lower abdominal pain. Abdominal exam without peritoneal signs. No evidence of acute abdomen at this time. Well appearing.  Considered and doubt ovarian torsion given history and presentation.  No pain in pelvis that would be consistent with PID or TOA especially in setting of no vaginal discharge.  Given work up, low suspicion for acute hepatobiliary disease (including acute cholecystitis), acute pancreatitis (neg lipase), PUD and gastric perforation, acute infectious processes (pneumonia, hepatitis, pyelonephritis), acute appendicitis, vascular catastrophe,  bowel obstruction or viscus perforation, diverticulitis.  Serial abdominal exam performed with soft, nontender, nondistended abdomen.  Presentation not consistent with other acute, emergent causes of abdominal pain at this time.  Patient's symptoms improved greatly at time of reassessment.      CT imaging of the abdomen/pelvis obtained showing increased stool burden indicating possible constipation.  Patient appears safe and stable for discharge and outpatient follow-up.  Advised continued hydration and small volume, bland diet.  Return to ED precautions given for worsening or changing symptoms.        Amount and/or Complexity of Data Reviewed  Labs: ordered.     Details: CBC: No significant anemia, platelet disorder, or leukocytosis.  CMP: Normal electrolytes, renal function, liver enzymes    Radiology: ordered and independent interpretation performed. Decision-making details documented in ED Course.    Risk  OTC drugs.  Prescription drug management.                    ED Prescriptions       Medication Sig Dispense Start Date End Date Auth. Provider    polyethylene glycol (GLYCOLAX) 17 gram/dose powder  (Status: Discontinued) Take 17 g by mouth once daily. 510 g 6/6/2025 6/6/2025 Blu Horne MD    polyethylene glycol (GLYCOLAX) 17 gram/dose powder Take 17 g by mouth once daily. 510 g 6/6/2025 7/6/2025 Blu Horne MD              Clinical Impression       Follow-up Information       Follow up With Specialties Details Why Contact Info    Michael Garcia MD Internal Medicine Schedule an appointment as soon as possible for a visit in 5 days For follow-up on today's visit. [address] [place] [address]  [phone]      [facility] - Emergency Dept Emergency Medicine Go to  As needed, If symptoms worsen [address] [facility]  Emergency Department  [facility] [id number] [phone]            Referrals:  No orders of the defined types were placed in this encounter.      Disposition    ED Disposition Condition    Discharge Stable              Final diagnoses:  [R10.31] Right lower quadrant abdominal pain (Primary)  [K59.00] Constipation, unspecified constipation type        Blu Horne MD        06/10/2025          DISCLAIMER: This note was prepared with [employer] voice recognition transcription software. Garbled syntax, mangled pronouns, and other bizarre constructions may be attributed to that software system.         [1]   Social History  Tobacco Use    Smoking status: Never     Passive exposure: Never    Smokeless tobacco: Never   Substance Use Topics    Alcohol use: No    Drug use: No        Blu Horne MD  06/10/25 0636

## 2025-06-06 NOTE — DISCHARGE INSTRUCTIONS
- Drink at least 1.5 Liters of water per day    - Take 1-2 teaspoons of metamucil three times per day    - You may take Miralax 2-3 times per day until stools are soft, and then once daily moving forward.   Juan Egan(Attending)

## 2025-06-10 ENCOUNTER — OFFICE VISIT (OUTPATIENT)
Dept: PRIMARY CARE CLINIC | Facility: CLINIC | Age: 35
End: 2025-06-10
Payer: COMMERCIAL

## 2025-06-10 ENCOUNTER — PATIENT MESSAGE (OUTPATIENT)
Dept: PRIMARY CARE CLINIC | Facility: CLINIC | Age: 35
End: 2025-06-10

## 2025-06-10 DIAGNOSIS — R30.0 DYSURIA: Primary | ICD-10-CM

## 2025-06-10 DIAGNOSIS — N93.9 ABNORMAL VAGINAL BLEEDING: Primary | ICD-10-CM

## 2025-06-10 PROCEDURE — 98004 SYNCH AUDIO-VIDEO EST SF 10: CPT | Mod: 95,,, | Performed by: STUDENT IN AN ORGANIZED HEALTH CARE EDUCATION/TRAINING PROGRAM

## 2025-06-10 RX ORDER — NITROFURANTOIN 25; 75 MG/1; MG/1
100 CAPSULE ORAL 2 TIMES DAILY
Qty: 10 CAPSULE | Refills: 0 | Status: SHIPPED | OUTPATIENT
Start: 2025-06-10 | End: 2025-06-15

## 2025-06-10 NOTE — PROGRESS NOTES
Telehealth Visit    The patient location is: [place]   The chief complaint leading to consultation is: UTI    Visit type: audiovisual    Face to Face time with patient: 5 minutes  10 minutes of total time spent on the encounter, which includes face to face time and non-face to face time preparing to see the patient (eg, review of tests), Obtaining and/or reviewing separately obtained history, Documenting clinical information in the electronic or other health record, Independently interpreting results (not separately reported) and communicating results to the patient/family/caregiver, or Care coordination (not separately reported).       HPI    Patient is a 34 y.o.   Unique Cody  has a past medical history of Kidney stone.       History of Present Illness    CHIEF COMPLAINT:  Patient presents today for follow up of urinary symptoms not improving with antibiotics    GENITOURINARY SYMPTOMS:  She reports dysuria and urinary frequency. She also experiences vaginal dryness and notes brown blood when wiping, described as dry blood. She denies vaginal odor and states these symptoms are new to her.    She recently visited the emergency room for severe abdominal pain. CT showed no evidence of kidney stones. Urinalysis revealed minimal bacterial presence.        Active Medications:  Current Outpatient Medications   Medication Instructions    ibuprofen (ADVIL,MOTRIN) 800 mg, Oral, 3 times daily, As needed for pelvic pain    nitrofurantoin, macrocrystal-monohydrate, (MACROBID) 100 MG capsule 100 mg, Oral, 2 times daily    polyethylene glycol (GLYCOLAX) 17 g, Oral, Daily       Physical Exam    General: Does not appear to be in acute distress      Assessment & Plan      DYSURIA:  Assessed that this may not be a typical UTI based on urine test results.  Explained that vaginal dryness can sometimes lead to symptoms similar to UTIs, including burning with urination.  Macorbid x 5 days   F/u in one week. Will consider referral to  GYN.      ABNORMAL VAGINAL BLEEDING:  Monitored the patient's report of noticing dry blood when wiping.  Assessed the absence of blood in urine sample and no renal stone noted on CT.  As above.           Orders Placed This Encounter    nitrofurantoin, macrocrystal-monohydrate, (MACROBID) 100 MG capsule         Upcoming Scheduled Appointments and Follow Up:    Future Appointments   Date Time Provider Department Center   9/11/2025  2:45 PM Tolu Lema, OD [facility] [place]   10/7/2025  2:00 PM Aleksandra Howard MD [facility]       Follow Up [facility] (with who? when?): No follow-ups on file.        Extended Emergency Contact Information  Primary Emergency Contact: Noreen Mario   [facility]  Home Phone: [phone]  Mobile Phone: [phone]  Relation: Mother      Michael Garcia MD   Internal Medicine  6/10/2025 - 11:42 AM        Each patient to whom he or she provides medical services by telemedicine is:  (1) informed of the relationship between the physician and patient and the respective role of any other health care provider with respect to management of the patient; and (2) notified that he or she may decline to receive medical services by telemedicine and may withdraw from such care at any time.    This note was generated with the assistance of ambient listening technology. Verbal consent was obtained by the patient and accompanying visitor(s) for the recording of patient appointment to facilitate this note. I attest to having reviewed and edited the generated note for accuracy, though some syntax or spelling errors may persist. Please contact the author of this note for any clarification.      While patients have the right to access their medical record, it is essential to recognize that progress notes primarily serve as a means of communication among healthcare professionals.

## 2025-06-24 NOTE — TELEPHONE ENCOUNTER
----- Message from Nayeli Shah sent at 5/8/2023 10:57 AM CDT -----  Contact: [phone]  Patient called, to inform that due to traffic is running 15 mins late.   Thank you.      Due for follow up

## 2025-07-11 NOTE — PROGRESS NOTES
"HISTORY OF PRESENT ILLNESS:    Unique Cody is a 34 y.o. female, , Patient's last menstrual period was 2025 (exact date).,  presents with c/o brown vaginal spotting and vaginal dryness with intercourse. Pt reports she had a full cycle that started on , then had intermittent spotting for about 4 days, then cycle started . Pt reports first time for intermenstrual bleeding. Pt reports cycles are usually every 4 weeks lasting 4-7 days, using a pad with tampon- changing tampons twice within an hour. Pt reports intermittent fasting, started about a week ago. Pt reports using metamucil recently due to stool noted on CT scan.     Past Medical History:   Diagnosis Date    Kidney stone      Past Surgical History:   Procedure Laterality Date    DIAGNOSTIC LAPAROSCOPY      KNEE ARTHROPLASTY Right      MEDICATIONS AND ALLERGIES:    Current Medications[1]    Review of patient's allergies indicates:  No Known Allergies    Family History   Problem Relation Name Age of Onset    Hypertension Mother      Blindness Neg Hx      Amblyopia Neg Hx      Cancer Neg Hx      Cataracts Neg Hx      Diabetes Neg Hx      Glaucoma Neg Hx      Macular degeneration Neg Hx      Retinal detachment Neg Hx      Strabismus Neg Hx      Stroke Neg Hx      Thyroid disease Neg Hx      Breast cancer Neg Hx      Colon cancer Neg Hx      Ovarian cancer Neg Hx       Social History[2]    ROS:  GENERAL: No weight changes. No swelling. No fatigue. No fever.  BREASTS: No pain. No lumps. No discharge.  ABDOMEN: No pain. No nausea. No vomiting. No diarrhea. No constipation.  REPRODUCTIVE: Reports irregular spotting.  VULVA: No pain. No lesions. No itching.  VAGINA: No relaxation. No itching. No odor. No discharge. No lesions. Reports dryness.  URINARY: No incontinence. No nocturia. No frequency. No dysuria.    /80   Ht 5' 7" (1.702 m)   Wt 83.4 kg (183 lb 13.8 oz)   LMP 2025 (Exact Date)   BMI 28.80 kg/m²     PE:  APPEARANCE: " Well nourished, well developed, in no acute distress.  AFFECT: WNL, alert and oriented x 3.  SKIN: Warm and dry  PELVIC: External female genitalia without lesions.  Female hair distribution. Adequate perineal body, Normal urethral meatus. Vagina moist and well rugated without lesions or discharge.  No significant cystocele or rectocele present. Cervix pink without lesions, discharge or tenderness. Uterus is normal in size, mobile and nontender. Adnexa without masses or tenderness.  EXTREMITIES: No edema    DIAGNOSIS:  1. Irregular bleeding    2. Vaginal dryness      PLAN:  -UPT negative at OV today  -Affirm & GC/CT culture performed today  -Will send order for pelvic U/S  -Discussed use of vaginal lubricants with intercourse and vaginal moisturizers  -Blood labs to be drawn: TSH, ha1c, estradiol, FSH, DHEA-S, total testosterone    COUNSELING:  Discussed bleeding precautions and when to call the office or seek ER care    FOLLOW-UP with me as needed or if symptoms persist or worsen    Time spent over 20 minutes   This includes face to face time and non-face to face time preparing to see the patient (eg, review of tests), obtaining and/or reviewing separately obtained history, documenting clinical information in the electronic or other health record, independently interpreting results and communicating results to the patient/family/caregiver, or care coordinator.     Tawnya Adams, DNP, RN, APRN, WHNP-BC [facility]- [facility]          [1]   Current Outpatient Medications:     ibuprofen (ADVIL,MOTRIN) 800 MG tablet, Take 1 tablet (800 mg total) by mouth 3 (three) times daily. As needed for pelvic pain, Disp: 30 tablet, Rfl: 1  [2]   Social History  Socioeconomic History    Marital status: Single   Tobacco Use    Smoking status: Never     Passive exposure: Never    Smokeless tobacco: Never   Substance and Sexual Activity    Alcohol use: No    Drug use: No    Sexual activity: Yes     Partners: Male      Birth control/protection: None     Social Drivers of Health     Financial Resource Strain: Low Risk  (6/10/2025)    Overall Financial Resource Strain (CARDIA)     Difficulty of Paying Living Expenses: Not very hard   Food Insecurity: No Food Insecurity (6/10/2025)    Hunger Vital Sign     Worried About Running Out of Food in the Last Year: Never true     Ran Out of Food in the Last Year: Never true   Transportation Needs: No Transportation Needs (6/10/2025)    PRAPARE - Transportation     Lack of Transportation (Medical): No     Lack of Transportation (Non-Medical): No   Physical Activity: Insufficiently Active (6/10/2025)    Exercise Vital Sign     Days of Exercise per Week: 3 days     Minutes of Exercise per Session: 40 min   Stress: No Stress Concern Present (6/10/2025)    [employer] [place] [employer] - Occupational Stress Questionnaire     Feeling of Stress : Not at all   Housing Stability: Low Risk  (6/10/2025)    Housing Stability Vital Sign     Unable to Pay for Housing in the Last Year: No     Number of Times Moved in the Last Year: 0     Homeless in the Last Year: No

## 2025-07-14 ENCOUNTER — OFFICE VISIT (OUTPATIENT)
Dept: OBSTETRICS AND GYNECOLOGY | Facility: CLINIC | Age: 35
End: 2025-07-14
Payer: COMMERCIAL

## 2025-07-14 ENCOUNTER — LAB VISIT (OUTPATIENT)
Dept: LAB | Facility: HOSPITAL | Age: 35
End: 2025-07-14
Attending: NURSE PRACTITIONER
Payer: COMMERCIAL

## 2025-07-14 VITALS
DIASTOLIC BLOOD PRESSURE: 80 MMHG | BODY MASS INDEX: 28.86 KG/M2 | HEIGHT: 67 IN | SYSTOLIC BLOOD PRESSURE: 120 MMHG | WEIGHT: 183.88 LBS

## 2025-07-14 DIAGNOSIS — N89.8 VAGINAL DRYNESS: ICD-10-CM

## 2025-07-14 DIAGNOSIS — N92.6 IRREGULAR BLEEDING: ICD-10-CM

## 2025-07-14 DIAGNOSIS — N92.6 IRREGULAR BLEEDING: Primary | ICD-10-CM

## 2025-07-14 LAB
B-HCG UR QL: NEGATIVE
CTP QC/QA: YES

## 2025-07-14 PROCEDURE — 82670 ASSAY OF TOTAL ESTRADIOL: CPT

## 2025-07-14 PROCEDURE — 1159F MED LIST DOCD IN RCRD: CPT | Mod: CPTII,S$GLB,, | Performed by: NURSE PRACTITIONER

## 2025-07-14 PROCEDURE — 99213 OFFICE O/P EST LOW 20 MIN: CPT | Mod: S$GLB,,, | Performed by: NURSE PRACTITIONER

## 2025-07-14 PROCEDURE — 82627 DEHYDROEPIANDROSTERONE: CPT

## 2025-07-14 PROCEDURE — 83036 HEMOGLOBIN GLYCOSYLATED A1C: CPT

## 2025-07-14 PROCEDURE — 99999 PR PBB SHADOW E&M-EST. PATIENT-LVL III: CPT | Mod: PBBFAC,,, | Performed by: NURSE PRACTITIONER

## 2025-07-14 PROCEDURE — 1160F RVW MEDS BY RX/DR IN RCRD: CPT | Mod: CPTII,S$GLB,, | Performed by: NURSE PRACTITIONER

## 2025-07-14 PROCEDURE — 3008F BODY MASS INDEX DOCD: CPT | Mod: CPTII,S$GLB,, | Performed by: NURSE PRACTITIONER

## 2025-07-14 PROCEDURE — 3079F DIAST BP 80-89 MM HG: CPT | Mod: CPTII,S$GLB,, | Performed by: NURSE PRACTITIONER

## 2025-07-14 PROCEDURE — 84443 ASSAY THYROID STIM HORMONE: CPT

## 2025-07-14 PROCEDURE — 83001 ASSAY OF GONADOTROPIN (FSH): CPT

## 2025-07-14 PROCEDURE — 36415 COLL VENOUS BLD VENIPUNCTURE: CPT | Mod: PN

## 2025-07-14 PROCEDURE — 84403 ASSAY OF TOTAL TESTOSTERONE: CPT

## 2025-07-14 PROCEDURE — 81025 URINE PREGNANCY TEST: CPT | Mod: S$GLB,,, | Performed by: NURSE PRACTITIONER

## 2025-07-14 PROCEDURE — 3074F SYST BP LT 130 MM HG: CPT | Mod: CPTII,S$GLB,, | Performed by: NURSE PRACTITIONER

## 2025-07-15 LAB
DHEA-S SERPL-MCNC: 403.6 UG/DL (ref 74.8–410.2)
EAG (OHS): 103 MG/DL (ref 68–131)
ESTRADIOL SERPL HS-MCNC: 29 PG/ML
FSH SERPL-ACNC: 7.24 MIU/ML
HBA1C MFR BLD: 5.2 % (ref 4–5.6)
TESTOST SERPL-MCNC: 52 NG/DL (ref 5–73)
TSH SERPL-ACNC: 0.85 UIU/ML (ref 0.4–4)

## 2025-07-16 ENCOUNTER — HOSPITAL ENCOUNTER (OUTPATIENT)
Dept: RADIOLOGY | Facility: HOSPITAL | Age: 35
Discharge: HOME | End: 2025-07-16
Attending: NURSE PRACTITIONER
Payer: COMMERCIAL

## 2025-07-16 DIAGNOSIS — N92.6 IRREGULAR BLEEDING: ICD-10-CM

## 2025-07-16 PROCEDURE — 76830 TRANSVAGINAL US NON-OB: CPT | Mod: TC,PN

## 2025-07-16 PROCEDURE — 76830 TRANSVAGINAL US NON-OB: CPT | Mod: 26,,, | Performed by: RADIOLOGY

## 2025-07-16 PROCEDURE — 76856 US EXAM PELVIC COMPLETE: CPT | Mod: 26,,, | Performed by: RADIOLOGY

## 2025-07-21 ENCOUNTER — OFFICE VISIT (OUTPATIENT)
Dept: OBSTETRICS AND GYNECOLOGY | Facility: CLINIC | Age: 35
End: 2025-07-21
Payer: COMMERCIAL

## 2025-07-21 DIAGNOSIS — N76.0 BACTERIAL VAGINOSIS: Primary | ICD-10-CM

## 2025-07-21 DIAGNOSIS — D25.9 UTERINE LEIOMYOMA, UNSPECIFIED LOCATION: ICD-10-CM

## 2025-07-21 DIAGNOSIS — B96.89 BACTERIAL VAGINOSIS: Primary | ICD-10-CM

## 2025-07-21 DIAGNOSIS — N92.6 IRREGULAR BLEEDING: Primary | ICD-10-CM

## 2025-07-21 PROCEDURE — 3044F HG A1C LEVEL LT 7.0%: CPT | Mod: CPTII,95,, | Performed by: NURSE PRACTITIONER

## 2025-07-21 PROCEDURE — 98005 SYNCH AUDIO-VIDEO EST LOW 20: CPT | Mod: 95,,, | Performed by: NURSE PRACTITIONER

## 2025-07-21 PROCEDURE — 1160F RVW MEDS BY RX/DR IN RCRD: CPT | Mod: CPTII,95,, | Performed by: NURSE PRACTITIONER

## 2025-07-21 PROCEDURE — 1159F MED LIST DOCD IN RCRD: CPT | Mod: CPTII,95,, | Performed by: NURSE PRACTITIONER

## 2025-07-21 RX ORDER — METRONIDAZOLE 500 MG/1
500 TABLET ORAL 2 TIMES DAILY
Qty: 14 TABLET | Refills: 0 | Status: SHIPPED | OUTPATIENT
Start: 2025-07-21 | End: 2025-07-28

## 2025-07-21 RX ORDER — FLUCONAZOLE 150 MG/1
150 TABLET ORAL
Qty: 2 TABLET | Refills: 0 | Status: SHIPPED | OUTPATIENT
Start: 2025-07-21

## 2025-07-21 NOTE — PROGRESS NOTES
Patient is located in: [place]  Chief complaint: Irregular spotting/review results  Visit type: Virtual visit with audio/visual   Time spent with patient: 13 mins    HISTORY OF PRESENT ILLNESS:    Unique Cody is a 34 y.o. female, , Patient's last menstrual period was 2025 (exact date).,  presents to discuss lab results and pelvic U/S results.     Visit on 25 note:  Pt presents with c/o brown vaginal spotting and vaginal dryness with intercourse. Pt reports she had a full cycle that started on , then had intermittent spotting for about 4 days, then cycle started . Pt reports first time for intermenstrual bleeding. Pt reports cycles are usually every 4 weeks lasting 4-7 days, using a pad with tampon- changing tampons twice within an hour.      Pelvic U/S performed on 2025-   FINDINGS: Uterus appears normal measuring 9.5 x 4.6 x 5.5 cm.  The endometrial thickness is 7.2 mm.  1.4 and 1.1 cm fibroids are seen within the uterine body which appear to be intramural. Right ovary is normal and measures 4.7 x 2.1 x 1.9 cm.  Left ovary is normal and measures 3.4 x 2.5 x 2.9 cm. Flow is noted bilaterally. There is no free pelvic fluid.  Impression: Small intramural fibroids.    Past Medical History:   Diagnosis Date    Kidney stone      Past Surgical History:   Procedure Laterality Date    DIAGNOSTIC LAPAROSCOPY      KNEE ARTHROPLASTY Right      MEDICATIONS AND ALLERGIES:    Current Medications[1]    Review of patient's allergies indicates:  No Known Allergies    Family History   Problem Relation Name Age of Onset    Hypertension Mother      Blindness Neg Hx      Amblyopia Neg Hx      Cancer Neg Hx      Cataracts Neg Hx      Diabetes Neg Hx      Glaucoma Neg Hx      Macular degeneration Neg Hx      Retinal detachment Neg Hx      Strabismus Neg Hx      Stroke Neg Hx      Thyroid disease Neg Hx      Breast cancer Neg Hx      Colon cancer Neg Hx      Ovarian cancer Neg Hx       Social  History[2]    ROS:  GENERAL: No weight changes. No swelling. No fatigue. No fever.  BREASTS: No pain. No lumps. No discharge.  ABDOMEN: No pain. No nausea. No vomiting. No diarrhea. No constipation.  REPRODUCTIVE: Reports irregular bleeding.   VULVA: No pain. No lesions. No itching.  VAGINA: No relaxation. No itching. No odor. No discharge. No lesions.  URINARY: No incontinence. No nocturia. No frequency. No dysuria.    LMP 07/11/2025 (Exact Date)     PE:  APPEARANCE: Well nourished, well developed, in no acute distress.  AFFECT: WNL, alert and oriented x 3.    DIAGNOSIS:  1. Irregular bleeding    2. Uterine leiomyoma, unspecified location      PLAN:  -Reviewed blood labs: hormonal blood labs all within normal limits, but we will need to monitor the DHEA-S lab in the future as it is high normal; will repeat DHEA-S in 1 month; if abnormal will send referral to endocrinology for evaluation  -Reviewed pelvic U/S results: small uterine fibroids- benign growths within the uterus, could cause heavy cycles, painful cycles, irregular bleeding, pelvic pain- discussed hormonal BC methods vs surgical options (myomectomy) to regulate irregular bleeding; pt declines hormones at this time, open to use of provera tablets if needed; advised pt to track cycles/irregular bleeding; will monitor at this time    Orders Placed This Encounter    DHEA-Sulfate     COUNSELING:  Discussed bleeding precautions and when to seek ER care or call the office    Face to Face time with patient: 13  Over 20 minutes of total time spent on the encounter, which includes face to face time and non-face to face time preparing to see the patient (eg, review of tests), Obtaining and/or reviewing separately obtained history, Documenting clinical information in the electronic or other health record, Independently interpreting results (not separately reported) and communicating results to the patient/family/caregiver, or Care coordination (not separately  reported).     Each patient to whom he or she provides medical services by telemedicine is:  (1) informed of the relationship between the physician and patient and the respective role of any other health care provider with respect to management of the patient; and (2) notified that he or she may decline to receive medical services by telemedicine and may withdraw from such care at any time.    FOLLOW-UP with me as needed or if symptoms persist or worsen    Tawnya Adams, DNP, RN, APRN, WHNP-BC [facility]- [facility]        [1]   Current Outpatient Medications:     fluconazole (DIFLUCAN) 150 MG Tab, Take 1 tablet (150 mg total) by mouth every 72 hours as needed (vaginal itching/discharge)., Disp: 2 tablet, Rfl: 0    ibuprofen (ADVIL,MOTRIN) 800 MG tablet, Take 1 tablet (800 mg total) by mouth 3 (three) times daily. As needed for pelvic pain, Disp: 30 tablet, Rfl: 1    metroNIDAZOLE (FLAGYL) 500 MG tablet, Take 1 tablet (500 mg total) by mouth 2 (two) times a day. for 7 days, Disp: 14 tablet, Rfl: 0  [2]   Social History  Socioeconomic History    Marital status: Single   Tobacco Use    Smoking status: Never     Passive exposure: Never    Smokeless tobacco: Never   Substance and Sexual Activity    Alcohol use: No    Drug use: No    Sexual activity: Yes     Partners: Male     Birth control/protection: None     Social Drivers of Health     Financial Resource Strain: Low Risk  (6/10/2025)    Overall Financial Resource Strain (CARDIA)     Difficulty of Paying Living Expenses: Not very hard   Food Insecurity: No Food Insecurity (6/10/2025)    Hunger Vital Sign     Worried About Running Out of Food in the Last Year: Never true     Ran Out of Food in the Last Year: Never true   Transportation Needs: No Transportation Needs (6/10/2025)    PRAPARE - Transportation     Lack of Transportation (Medical): No     Lack of Transportation (Non-Medical): No   Physical Activity: Insufficiently Active (6/10/2025)     Exercise Vital Sign     Days of Exercise per Week: 3 days     Minutes of Exercise per Session: 40 min   Stress: No Stress Concern Present (6/10/2025)    [employer] [place] [employer] - Occupational Stress Questionnaire     Feeling of Stress : Not at all   Housing Stability: Low Risk  (6/10/2025)    Housing Stability Vital Sign     Unable to Pay for Housing in the Last Year: No     Number of Times Moved in the Last Year: 0     Homeless in the Last Year: No

## 2025-07-21 NOTE — PROGRESS NOTES
Positive results for bacterial vaginosis (BV), sent flagyl and diflucan to pharmacy. Pt notified via [employer] messages.

## 2025-08-19 ENCOUNTER — OFFICE VISIT (OUTPATIENT)
Dept: GASTROENTEROLOGY | Facility: CLINIC | Age: 35
End: 2025-08-19
Payer: COMMERCIAL

## 2025-08-19 VITALS
DIASTOLIC BLOOD PRESSURE: 61 MMHG | HEART RATE: 79 BPM | HEIGHT: 67 IN | SYSTOLIC BLOOD PRESSURE: 117 MMHG | WEIGHT: 184.5 LBS | OXYGEN SATURATION: 98 % | BODY MASS INDEX: 28.96 KG/M2

## 2025-08-19 DIAGNOSIS — K62.5 RECTAL BLEEDING: Primary | ICD-10-CM

## 2025-08-19 DIAGNOSIS — R10.32 LEFT LOWER QUADRANT ABDOMINAL PAIN: ICD-10-CM

## 2025-08-19 PROCEDURE — 1159F MED LIST DOCD IN RCRD: CPT | Mod: CPTII,S$GLB,, | Performed by: STUDENT IN AN ORGANIZED HEALTH CARE EDUCATION/TRAINING PROGRAM

## 2025-08-19 PROCEDURE — 3044F HG A1C LEVEL LT 7.0%: CPT | Mod: CPTII,S$GLB,, | Performed by: STUDENT IN AN ORGANIZED HEALTH CARE EDUCATION/TRAINING PROGRAM

## 2025-08-19 PROCEDURE — 3008F BODY MASS INDEX DOCD: CPT | Mod: CPTII,S$GLB,, | Performed by: STUDENT IN AN ORGANIZED HEALTH CARE EDUCATION/TRAINING PROGRAM

## 2025-08-19 PROCEDURE — 3078F DIAST BP <80 MM HG: CPT | Mod: CPTII,S$GLB,, | Performed by: STUDENT IN AN ORGANIZED HEALTH CARE EDUCATION/TRAINING PROGRAM

## 2025-08-19 PROCEDURE — 99204 OFFICE O/P NEW MOD 45 MIN: CPT | Mod: S$GLB,,, | Performed by: STUDENT IN AN ORGANIZED HEALTH CARE EDUCATION/TRAINING PROGRAM

## 2025-08-19 PROCEDURE — 3074F SYST BP LT 130 MM HG: CPT | Mod: CPTII,S$GLB,, | Performed by: STUDENT IN AN ORGANIZED HEALTH CARE EDUCATION/TRAINING PROGRAM

## 2025-08-19 PROCEDURE — 99999 PR PBB SHADOW E&M-EST. PATIENT-LVL III: CPT | Mod: PBBFAC,,, | Performed by: STUDENT IN AN ORGANIZED HEALTH CARE EDUCATION/TRAINING PROGRAM

## 2025-08-19 RX ORDER — POLYETHYLENE GLYCOL 3350, SODIUM SULFATE ANHYDROUS, SODIUM BICARBONATE, SODIUM CHLORIDE, POTASSIUM CHLORIDE 236; 22.74; 6.74; 5.86; 2.97 G/4L; G/4L; G/4L; G/4L; G/4L
4 POWDER, FOR SOLUTION ORAL ONCE
Qty: 4000 ML | Refills: 0 | Status: SHIPPED | OUTPATIENT
Start: 2025-08-19 | End: 2025-08-19

## 2025-08-19 RX ORDER — DICYCLOMINE HYDROCHLORIDE 20 MG/1
20 TABLET ORAL 3 TIMES DAILY PRN
Qty: 30 TABLET | Refills: 0 | Status: SHIPPED | OUTPATIENT
Start: 2025-08-19 | End: 2025-09-18

## 2025-08-29 ENCOUNTER — LAB VISIT (OUTPATIENT)
Dept: LAB | Facility: HOSPITAL | Age: 35
End: 2025-08-29
Attending: NURSE PRACTITIONER
Payer: COMMERCIAL

## 2025-08-29 ENCOUNTER — CLINICAL SUPPORT (OUTPATIENT)
Dept: OBSTETRICS AND GYNECOLOGY | Facility: CLINIC | Age: 35
End: 2025-08-29
Payer: COMMERCIAL

## 2025-08-29 ENCOUNTER — TELEPHONE (OUTPATIENT)
Dept: OBSTETRICS AND GYNECOLOGY | Facility: CLINIC | Age: 35
End: 2025-08-29
Payer: COMMERCIAL

## 2025-08-29 DIAGNOSIS — N92.6 IRREGULAR BLEEDING: Primary | ICD-10-CM

## 2025-08-29 DIAGNOSIS — N92.6 IRREGULAR BLEEDING: ICD-10-CM

## 2025-08-29 LAB
B-HCG UR QL: NEGATIVE
CTP QC/QA: YES

## 2025-08-29 PROCEDURE — 36415 COLL VENOUS BLD VENIPUNCTURE: CPT | Mod: PN

## 2025-08-29 PROCEDURE — 82627 DEHYDROEPIANDROSTERONE: CPT

## 2025-08-29 PROCEDURE — 99999 PR PBB SHADOW E&M-EST. PATIENT-LVL II: CPT | Mod: PBBFAC,,, | Performed by: NURSE PRACTITIONER

## 2025-08-30 LAB — DHEA-S SERPL-MCNC: 386.7 UG/DL (ref 74.8–410.2)
